# Patient Record
Sex: MALE | Race: WHITE | Employment: OTHER | URBAN - METROPOLITAN AREA
[De-identification: names, ages, dates, MRNs, and addresses within clinical notes are randomized per-mention and may not be internally consistent; named-entity substitution may affect disease eponyms.]

---

## 2019-11-14 PROBLEM — M51.36 DDD (DEGENERATIVE DISC DISEASE), LUMBAR: Status: ACTIVE | Noted: 2019-11-14

## 2019-11-14 PROBLEM — M21.371 FOOT DROP, RIGHT: Status: ACTIVE | Noted: 2019-11-14

## 2019-11-14 PROBLEM — M48.062 LUMBAR STENOSIS WITH NEUROGENIC CLAUDICATION: Status: ACTIVE | Noted: 2019-11-14

## 2019-12-02 ENCOUNTER — HOSPITAL ENCOUNTER (OUTPATIENT)
Dept: SURGERY | Age: 79
Discharge: HOME | End: 2019-12-02
Payer: MEDICARE

## 2019-12-02 VITALS
HEART RATE: 73 BPM | WEIGHT: 131.25 LBS | BODY MASS INDEX: 21.87 KG/M2 | RESPIRATION RATE: 18 BRPM | TEMPERATURE: 97.7 F | HEIGHT: 65 IN | OXYGEN SATURATION: 98 %

## 2019-12-02 LAB
ANION GAP SERPL CALC-SCNC: 7 MMOL/L (ref 7–16)
APPEARANCE UR: CLEAR
BACTERIA SPEC CULT: NORMAL
BACTERIA URNS QL MICRO: 0 /HPF
BASOPHILS # BLD: 0 K/UL (ref 0–0.2)
BASOPHILS NFR BLD: 1 % (ref 0–2)
BILIRUB UR QL: NEGATIVE
BUN SERPL-MCNC: 19 MG/DL (ref 8–23)
CALCIUM SERPL-MCNC: 8.9 MG/DL (ref 8.3–10.4)
CASTS URNS QL MICRO: ABNORMAL /LPF
CHLORIDE SERPL-SCNC: 109 MMOL/L (ref 98–107)
CO2 SERPL-SCNC: 29 MMOL/L (ref 21–32)
COLOR UR: YELLOW
CREAT SERPL-MCNC: 0.92 MG/DL (ref 0.8–1.5)
DIFFERENTIAL METHOD BLD: ABNORMAL
EOSINOPHIL # BLD: 0.1 K/UL (ref 0–0.8)
EOSINOPHIL NFR BLD: 2 % (ref 0.5–7.8)
EPI CELLS #/AREA URNS HPF: ABNORMAL /HPF
ERYTHROCYTE [DISTWIDTH] IN BLOOD BY AUTOMATED COUNT: 12.8 % (ref 11.9–14.6)
GLUCOSE SERPL-MCNC: 140 MG/DL (ref 65–100)
GLUCOSE UR STRIP.AUTO-MCNC: 100 MG/DL
HCT VFR BLD AUTO: 40.8 % (ref 41.1–50.3)
HGB BLD-MCNC: 13.6 G/DL (ref 13.6–17.2)
HGB UR QL STRIP: NEGATIVE
IMM GRANULOCYTES # BLD AUTO: 0 K/UL (ref 0–0.5)
IMM GRANULOCYTES NFR BLD AUTO: 0 % (ref 0–5)
KETONES UR QL STRIP.AUTO: NEGATIVE MG/DL
LEUKOCYTE ESTERASE UR QL STRIP.AUTO: ABNORMAL
LYMPHOCYTES # BLD: 1.3 K/UL (ref 0.5–4.6)
LYMPHOCYTES NFR BLD: 23 % (ref 13–44)
MCH RBC QN AUTO: 33.3 PG (ref 26.1–32.9)
MCHC RBC AUTO-ENTMCNC: 33.3 G/DL (ref 31.4–35)
MCV RBC AUTO: 99.8 FL (ref 79.6–97.8)
MONOCYTES # BLD: 0.6 K/UL (ref 0.1–1.3)
MONOCYTES NFR BLD: 10 % (ref 4–12)
NEUTS SEG # BLD: 3.6 K/UL (ref 1.7–8.2)
NEUTS SEG NFR BLD: 65 % (ref 43–78)
NITRITE UR QL STRIP.AUTO: NEGATIVE
NRBC # BLD: 0 K/UL (ref 0–0.2)
PH UR STRIP: 6 [PH] (ref 5–9)
PLATELET # BLD AUTO: 94 K/UL (ref 150–450)
PMV BLD AUTO: 11.6 FL (ref 9.4–12.3)
POTASSIUM SERPL-SCNC: 3.6 MMOL/L (ref 3.5–5.1)
PROT UR STRIP-MCNC: NEGATIVE MG/DL
RBC # BLD AUTO: 4.09 M/UL (ref 4.23–5.6)
RBC #/AREA URNS HPF: ABNORMAL /HPF
SERVICE CMNT-IMP: NORMAL
SODIUM SERPL-SCNC: 145 MMOL/L (ref 136–145)
SP GR UR REFRACTOMETRY: 1.02 (ref 1–1.02)
UROBILINOGEN UR QL STRIP.AUTO: 1 EU/DL (ref 0.2–1)
WBC # BLD AUTO: 5.6 K/UL (ref 4.3–11.1)
WBC URNS QL MICRO: ABNORMAL /HPF

## 2019-12-02 PROCEDURE — 85025 COMPLETE CBC W/AUTO DIFF WBC: CPT

## 2019-12-02 PROCEDURE — 81001 URINALYSIS AUTO W/SCOPE: CPT

## 2019-12-02 PROCEDURE — 80048 BASIC METABOLIC PNL TOTAL CA: CPT

## 2019-12-02 PROCEDURE — 87641 MR-STAPH DNA AMP PROBE: CPT

## 2019-12-02 NOTE — PERIOP NOTES
PLEASE CONTINUE TAKING ALL PRESCRIPTION MEDICATIONS UP TO THE DAY OF SURGERY UNLESS OTHERWISE DIRECTED BELOW. DISCONTINUE all vitamins and supplements 7 days prior to surgery. Home Medications to take  the day of surgery    Tylenol if needed, Levothyroxin           Home Medications   to Hold   Vitamin D, COQ10   Vitamins, Supplements, and Herbals. Non-Steriodal Anti-Inflammatory (NSAIDS) such as Advil and Aleve. Comments                Please do not bring home medications with you on the day of surgery unless otherwise directed by your nurse. If you are instructed to bring home medications, please give them to your nurse as they will be administered by the nursing staff. If you have any questions, [facility] [phone] or [facility] [phone].

## 2019-12-02 NOTE — PERIOP NOTES
Recent Results (from the past 12 hour(s))   CBC WITH AUTOMATED DIFF    Collection Time: 12/02/19 11:02 AM   Result Value Ref Range    WBC 5.6 4.3 - 11.1 K/uL    RBC 4.09 (L) 4.23 - 5.6 M/uL    HGB 13.6 13.6 - 17.2 g/dL    HCT 40.8 (L) 41.1 - 50.3 %    MCV 99.8 (H) 79.6 - 97.8 FL    MCH 33.3 (H) 26.1 - 32.9 PG    MCHC 33.3 31.4 - 35.0 g/dL    RDW 12.8 11.9 - 14.6 %    PLATELET 94 (L) 183 - 450 K/uL    MPV 11.6 9.4 - 12.3 FL    ABSOLUTE NRBC 0.00 0.0 - 0.2 K/uL    DF AUTOMATED      NEUTROPHILS 65 43 - 78 %    LYMPHOCYTES 23 13 - 44 %    MONOCYTES 10 4.0 - 12.0 %    EOSINOPHILS 2 0.5 - 7.8 %    BASOPHILS 1 0.0 - 2.0 %    IMMATURE GRANULOCYTES 0 0.0 - 5.0 %    ABS. NEUTROPHILS 3.6 1.7 - 8.2 K/UL    ABS. LYMPHOCYTES 1.3 0.5 - 4.6 K/UL    ABS. MONOCYTES 0.6 0.1 - 1.3 K/UL    ABS. EOSINOPHILS 0.1 0.0 - 0.8 K/UL    ABS. BASOPHILS 0.0 0.0 - 0.2 K/UL    ABS. IMM.  GRANS. 0.0 0.0 - 0.5 K/UL   METABOLIC PANEL, BASIC    Collection Time: 12/02/19 11:02 AM   Result Value Ref Range    Sodium 145 136 - 145 mmol/L    Potassium 3.6 3.5 - 5.1 mmol/L    Chloride 109 (H) 98 - 107 mmol/L    CO2 29 21 - 32 mmol/L    Anion gap 7 7 - 16 mmol/L    Glucose 140 (H) 65 - 100 mg/dL    BUN 19 8 - 23 MG/DL    Creatinine 0.92 0.8 - 1.5 MG/DL    GFR est AA >60 >60 ml/min/1.73m2    GFR est non-AA >60 >60 ml/min/1.73m2    Calcium 8.9 8.3 - 10.4 MG/DL   URINALYSIS W/ RFLX MICROSCOPIC    Collection Time: 12/02/19 11:02 AM   Result Value Ref Range    Color YELLOW      Appearance CLEAR      Specific gravity 1.018 1.001 - 1.023      pH (UA) 6.0 5.0 - 9.0      Protein NEGATIVE  NEG mg/dL    Glucose 100 mg/dL    Ketone NEGATIVE  NEG mg/dL    Bilirubin NEGATIVE  NEG      Blood NEGATIVE  NEG      Urobilinogen 1.0 0.2 - 1.0 EU/dL    Nitrites NEGATIVE  NEG      Leukocyte Esterase TRACE (A) NEG      WBC 0-3 0 /hpf    RBC 0-3 0 /hpf    Epithelial cells 0-3 0 /hpf    Bacteria 0 0 /hpf    Casts 0-3 0 /lpf   MSSA/MRSA SC BY PCR, NASAL SWAB    Collection Time: 12/02/19 11:02 AM   Result Value Ref Range    Special Requests: NO SPECIAL REQUESTS      Culture result:        SA target not detected. A MRSA NEGATIVE, SA NEGATIVE test result does not preclude MRSA or SA nasal colonization.    reviewed

## 2019-12-02 NOTE — PERIOP NOTES
Patient verified name and . Patient provided medical/health information and PTA medications to the best of their ability. TYPE  CASE:1B  Order for consent  found in EHR and matches case posting. Labs per surgeon:per anesthesia. Results: pending  Labs per anesthesia protocol: cbc,bmp,ua,mrsa. Results pending  EKG  :  Not needed at time of PAT    Patient provided with and instructed on education handouts including Guide to Surgery, blood transfusions, pain management, and hand hygiene for the family and community, and [facility] brochure.     hibiclens and instructions given per hospital policy. Instructed patient to continue previous medications as prescribed prior to surgery unless otherwise directed and to take the following medications the day of surgery according to anesthesia guidelines : Tylenol if needed, Levothyroxine . Instructed patient to hold  the following medications: Vitamin D, CoQ10. Original medication prescription bottles not visualized during patient appointment. Patient teach back successful and patient demonstrates knowledge of instruction.

## 2019-12-05 ENCOUNTER — ANESTHESIA EVENT (OUTPATIENT)
Dept: SURGERY | Age: 79
DRG: 519 | End: 2019-12-05
Payer: MEDICARE

## 2019-12-06 ENCOUNTER — ANESTHESIA (OUTPATIENT)
Dept: SURGERY | Age: 79
DRG: 519 | End: 2019-12-06
Payer: MEDICARE

## 2019-12-06 ENCOUNTER — HOSPITAL ENCOUNTER (INPATIENT)
Age: 79
LOS: 5 days | Discharge: NURSING, REHAB OR LONG-TERM CARE | DRG: 519 | End: 2019-12-11
Attending: NEUROLOGICAL SURGERY | Admitting: NEUROLOGICAL SURGERY
Payer: MEDICARE

## 2019-12-06 ENCOUNTER — APPOINTMENT (OUTPATIENT)
Dept: GENERAL RADIOLOGY | Age: 79
DRG: 519 | End: 2019-12-06
Attending: NEUROLOGICAL SURGERY
Payer: MEDICARE

## 2019-12-06 DIAGNOSIS — M48.062 LUMBAR STENOSIS WITH NEUROGENIC CLAUDICATION: Primary | ICD-10-CM

## 2019-12-06 PROCEDURE — 74011000272 HC RX REV CODE- 272: Performed by: NEUROLOGICAL SURGERY

## 2019-12-06 PROCEDURE — 51798 US URINE CAPACITY MEASURE: CPT

## 2019-12-06 PROCEDURE — 77030018836 HC SOL IRR NACL ICUM -A: Performed by: NEUROLOGICAL SURGERY

## 2019-12-06 PROCEDURE — 76010000173 HC OR TIME 3 TO 3.5 HR INTENSV-TIER 1: Performed by: NEUROLOGICAL SURGERY

## 2019-12-06 PROCEDURE — 77030018390 HC SPNG HEMSTAT2 J&J -B: Performed by: NEUROLOGICAL SURGERY

## 2019-12-06 PROCEDURE — 74011250636 HC RX REV CODE- 250/636: Performed by: REGISTERED NURSE

## 2019-12-06 PROCEDURE — 74011000250 HC RX REV CODE- 250: Performed by: REGISTERED NURSE

## 2019-12-06 PROCEDURE — 76210000006 HC OR PH I REC 0.5 TO 1 HR: Performed by: NEUROLOGICAL SURGERY

## 2019-12-06 PROCEDURE — 77030014007 HC SPNG HEMSTAT J&J -B: Performed by: NEUROLOGICAL SURGERY

## 2019-12-06 PROCEDURE — 77030039425 HC BLD LARYNG TRULITE DISP TELE -A: Performed by: ANESTHESIOLOGY

## 2019-12-06 PROCEDURE — 00NY0ZZ RELEASE LUMBAR SPINAL CORD, OPEN APPROACH: ICD-10-PCS | Performed by: NEUROLOGICAL SURGERY

## 2019-12-06 PROCEDURE — 77030037088 HC TUBE ENDOTRACH ORAL NSL COVD-A: Performed by: ANESTHESIOLOGY

## 2019-12-06 PROCEDURE — 77030019908 HC STETH ESOPH SIMS -A: Performed by: ANESTHESIOLOGY

## 2019-12-06 PROCEDURE — C1713 ANCHOR/SCREW BN/BN,TIS/BN: HCPCS | Performed by: NEUROLOGICAL SURGERY

## 2019-12-06 PROCEDURE — 77030028270 HC SRGFL HEMSTAT MTRX J&J -C: Performed by: NEUROLOGICAL SURGERY

## 2019-12-06 PROCEDURE — 74011250636 HC RX REV CODE- 250/636: Performed by: ANESTHESIOLOGY

## 2019-12-06 PROCEDURE — 77030030163 HC BN WAX J&J -A: Performed by: NEUROLOGICAL SURGERY

## 2019-12-06 PROCEDURE — 77010033678 HC OXYGEN DAILY

## 2019-12-06 PROCEDURE — 77030008468 HC STPLR SKN VISIS TELE -A: Performed by: NEUROLOGICAL SURGERY

## 2019-12-06 PROCEDURE — 74011000250 HC RX REV CODE- 250: Performed by: NEUROLOGICAL SURGERY

## 2019-12-06 PROCEDURE — 77030003029 HC SUT VCRL J&J -B: Performed by: NEUROLOGICAL SURGERY

## 2019-12-06 PROCEDURE — 77030040361 HC SLV COMPR DVT MDII -B: Performed by: NEUROLOGICAL SURGERY

## 2019-12-06 PROCEDURE — 94760 N-INVAS EAR/PLS OXIMETRY 1: CPT

## 2019-12-06 PROCEDURE — 77030033269 HC SLV COMPR SCD KNE2 CARD -B: Performed by: NEUROLOGICAL SURGERY

## 2019-12-06 PROCEDURE — 00QT0ZZ REPAIR SPINAL MENINGES, OPEN APPROACH: ICD-10-PCS | Performed by: NEUROLOGICAL SURGERY

## 2019-12-06 PROCEDURE — 77030002700 HC GRFT DURA SUTRBL INLC -D: Performed by: NEUROLOGICAL SURGERY

## 2019-12-06 PROCEDURE — 77030012894: Performed by: NEUROLOGICAL SURGERY

## 2019-12-06 PROCEDURE — 77030039267 HC ADH SKN EXOFIN S2SG -B: Performed by: NEUROLOGICAL SURGERY

## 2019-12-06 PROCEDURE — 65270000029 HC RM PRIVATE

## 2019-12-06 PROCEDURE — 74011250636 HC RX REV CODE- 250/636: Performed by: NEUROLOGICAL SURGERY

## 2019-12-06 PROCEDURE — 77030040922 HC BLNKT HYPOTHRM STRY -A: Performed by: ANESTHESIOLOGY

## 2019-12-06 PROCEDURE — 72020 X-RAY EXAM OF SPINE 1 VIEW: CPT

## 2019-12-06 PROCEDURE — 77030012935 HC DRSG AQUACEL BMS -B: Performed by: NEUROLOGICAL SURGERY

## 2019-12-06 PROCEDURE — 77030013951 HC SEAL TISS ADH DURASL KT INLC -G1: Performed by: NEUROLOGICAL SURGERY

## 2019-12-06 PROCEDURE — 76060000037 HC ANESTHESIA 3 TO 3.5 HR: Performed by: NEUROLOGICAL SURGERY

## 2019-12-06 PROCEDURE — 74011250637 HC RX REV CODE- 250/637: Performed by: NEUROLOGICAL SURGERY

## 2019-12-06 DEVICE — DURAGEN® DURAL GRAFT MATRIX 1PK, 2X2 DOM
Type: IMPLANTABLE DEVICE | Site: SPINE LUMBAR | Status: FUNCTIONAL
Brand: DURAGEN®

## 2019-12-06 RX ORDER — LIDOCAINE HYDROCHLORIDE 20 MG/ML
INJECTION, SOLUTION EPIDURAL; INFILTRATION; INTRACAUDAL; PERINEURAL AS NEEDED
Status: DISCONTINUED | OUTPATIENT
Start: 2019-12-06 | End: 2019-12-06 | Stop reason: HOSPADM

## 2019-12-06 RX ORDER — NALOXONE HYDROCHLORIDE 0.4 MG/ML
0.1 INJECTION, SOLUTION INTRAMUSCULAR; INTRAVENOUS; SUBCUTANEOUS AS NEEDED
Status: DISCONTINUED | OUTPATIENT
Start: 2019-12-06 | End: 2019-12-06 | Stop reason: HOSPADM

## 2019-12-06 RX ORDER — SODIUM CHLORIDE, SODIUM LACTATE, POTASSIUM CHLORIDE, CALCIUM CHLORIDE 600; 310; 30; 20 MG/100ML; MG/100ML; MG/100ML; MG/100ML
100 INJECTION, SOLUTION INTRAVENOUS CONTINUOUS
Status: DISCONTINUED | OUTPATIENT
Start: 2019-12-06 | End: 2019-12-06 | Stop reason: HOSPADM

## 2019-12-06 RX ORDER — OXYCODONE AND ACETAMINOPHEN 10; 325 MG/1; MG/1
1 TABLET ORAL
Status: DISCONTINUED | OUTPATIENT
Start: 2019-12-06 | End: 2019-12-11 | Stop reason: HOSPADM

## 2019-12-06 RX ORDER — ROCURONIUM BROMIDE 10 MG/ML
INJECTION, SOLUTION INTRAVENOUS AS NEEDED
Status: DISCONTINUED | OUTPATIENT
Start: 2019-12-06 | End: 2019-12-06 | Stop reason: HOSPADM

## 2019-12-06 RX ORDER — ATORVASTATIN CALCIUM 10 MG/1
10 TABLET, FILM COATED ORAL
Status: DISCONTINUED | OUTPATIENT
Start: 2019-12-06 | End: 2019-12-11 | Stop reason: HOSPADM

## 2019-12-06 RX ORDER — FENTANYL CITRATE 50 UG/ML
INJECTION, SOLUTION INTRAMUSCULAR; INTRAVENOUS AS NEEDED
Status: DISCONTINUED | OUTPATIENT
Start: 2019-12-06 | End: 2019-12-06 | Stop reason: HOSPADM

## 2019-12-06 RX ORDER — HYDROMORPHONE HYDROCHLORIDE 1 MG/ML
1 INJECTION, SOLUTION INTRAMUSCULAR; INTRAVENOUS; SUBCUTANEOUS
Status: DISCONTINUED | OUTPATIENT
Start: 2019-12-06 | End: 2019-12-07

## 2019-12-06 RX ORDER — ONDANSETRON 2 MG/ML
4 INJECTION INTRAMUSCULAR; INTRAVENOUS
Status: DISCONTINUED | OUTPATIENT
Start: 2019-12-06 | End: 2019-12-11 | Stop reason: HOSPADM

## 2019-12-06 RX ORDER — SODIUM CHLORIDE 0.9 % (FLUSH) 0.9 %
5-40 SYRINGE (ML) INJECTION EVERY 8 HOURS
Status: DISCONTINUED | OUTPATIENT
Start: 2019-12-06 | End: 2019-12-11 | Stop reason: HOSPADM

## 2019-12-06 RX ORDER — NEOSTIGMINE METHYLSULFATE 1 MG/ML
INJECTION, SOLUTION INTRAVENOUS AS NEEDED
Status: DISCONTINUED | OUTPATIENT
Start: 2019-12-06 | End: 2019-12-06 | Stop reason: HOSPADM

## 2019-12-06 RX ORDER — GLYCOPYRROLATE 0.2 MG/ML
INJECTION INTRAMUSCULAR; INTRAVENOUS AS NEEDED
Status: DISCONTINUED | OUTPATIENT
Start: 2019-12-06 | End: 2019-12-06 | Stop reason: HOSPADM

## 2019-12-06 RX ORDER — FLUTICASONE PROPIONATE 50 MCG
2 SPRAY, SUSPENSION (ML) NASAL DAILY
Status: DISCONTINUED | OUTPATIENT
Start: 2019-12-07 | End: 2019-12-11 | Stop reason: HOSPADM

## 2019-12-06 RX ORDER — ACETAMINOPHEN 10 MG/ML
INJECTION, SOLUTION INTRAVENOUS AS NEEDED
Status: DISCONTINUED | OUTPATIENT
Start: 2019-12-06 | End: 2019-12-06 | Stop reason: HOSPADM

## 2019-12-06 RX ORDER — ACETAMINOPHEN 325 MG/1
650 TABLET ORAL
Status: DISCONTINUED | OUTPATIENT
Start: 2019-12-06 | End: 2019-12-11 | Stop reason: HOSPADM

## 2019-12-06 RX ORDER — SODIUM CHLORIDE 0.9 % (FLUSH) 0.9 %
5-40 SYRINGE (ML) INJECTION AS NEEDED
Status: DISCONTINUED | OUTPATIENT
Start: 2019-12-06 | End: 2019-12-11 | Stop reason: HOSPADM

## 2019-12-06 RX ORDER — ZOLPIDEM TARTRATE 5 MG/1
5 TABLET ORAL
Status: DISCONTINUED | OUTPATIENT
Start: 2019-12-06 | End: 2019-12-11 | Stop reason: HOSPADM

## 2019-12-06 RX ORDER — OXYCODONE HYDROCHLORIDE 5 MG/1
5 TABLET ORAL
Status: DISCONTINUED | OUTPATIENT
Start: 2019-12-06 | End: 2019-12-06 | Stop reason: HOSPADM

## 2019-12-06 RX ORDER — DIPHENHYDRAMINE HYDROCHLORIDE 50 MG/ML
12.5 INJECTION, SOLUTION INTRAMUSCULAR; INTRAVENOUS
Status: DISCONTINUED | OUTPATIENT
Start: 2019-12-06 | End: 2019-12-06 | Stop reason: HOSPADM

## 2019-12-06 RX ORDER — PROPOFOL 10 MG/ML
INJECTION, EMULSION INTRAVENOUS AS NEEDED
Status: DISCONTINUED | OUTPATIENT
Start: 2019-12-06 | End: 2019-12-06 | Stop reason: HOSPADM

## 2019-12-06 RX ORDER — ALBUTEROL SULFATE 0.83 MG/ML
2.5 SOLUTION RESPIRATORY (INHALATION) AS NEEDED
Status: DISCONTINUED | OUTPATIENT
Start: 2019-12-06 | End: 2019-12-06 | Stop reason: HOSPADM

## 2019-12-06 RX ORDER — MIDAZOLAM HYDROCHLORIDE 1 MG/ML
2 INJECTION, SOLUTION INTRAMUSCULAR; INTRAVENOUS
Status: ACTIVE | OUTPATIENT
Start: 2019-12-06 | End: 2019-12-07

## 2019-12-06 RX ORDER — EPHEDRINE SULFATE/0.9% NACL/PF 50 MG/5 ML
SYRINGE (ML) INTRAVENOUS AS NEEDED
Status: DISCONTINUED | OUTPATIENT
Start: 2019-12-06 | End: 2019-12-06 | Stop reason: HOSPADM

## 2019-12-06 RX ORDER — LORAZEPAM 0.5 MG/1
1 TABLET ORAL
Status: DISCONTINUED | OUTPATIENT
Start: 2019-12-06 | End: 2019-12-11 | Stop reason: HOSPADM

## 2019-12-06 RX ORDER — SODIUM CHLORIDE 0.9 % (FLUSH) 0.9 %
5-40 SYRINGE (ML) INJECTION EVERY 8 HOURS
Status: DISCONTINUED | OUTPATIENT
Start: 2019-12-06 | End: 2019-12-11 | Stop reason: SDUPTHER

## 2019-12-06 RX ORDER — OXYCODONE HYDROCHLORIDE 5 MG/1
10 TABLET ORAL
Status: DISCONTINUED | OUTPATIENT
Start: 2019-12-06 | End: 2019-12-06 | Stop reason: HOSPADM

## 2019-12-06 RX ORDER — FOSINOPRIL SODIUM 20 MG/1
20 TABLET ORAL DAILY
Status: DISCONTINUED | OUTPATIENT
Start: 2019-12-07 | End: 2019-12-11 | Stop reason: HOSPADM

## 2019-12-06 RX ORDER — SODIUM CHLORIDE, SODIUM LACTATE, POTASSIUM CHLORIDE, CALCIUM CHLORIDE 600; 310; 30; 20 MG/100ML; MG/100ML; MG/100ML; MG/100ML
100 INJECTION, SOLUTION INTRAVENOUS CONTINUOUS
Status: DISPENSED | OUTPATIENT
Start: 2019-12-06 | End: 2019-12-07

## 2019-12-06 RX ORDER — CEFAZOLIN SODIUM/WATER 2 G/20 ML
2 SYRINGE (ML) INTRAVENOUS EVERY 8 HOURS
Status: DISCONTINUED | OUTPATIENT
Start: 2019-12-06 | End: 2019-12-10

## 2019-12-06 RX ORDER — TERAZOSIN 5 MG/1
5 CAPSULE ORAL
Status: DISCONTINUED | OUTPATIENT
Start: 2019-12-06 | End: 2019-12-11 | Stop reason: HOSPADM

## 2019-12-06 RX ORDER — ONDANSETRON 2 MG/ML
4 INJECTION INTRAMUSCULAR; INTRAVENOUS ONCE
Status: DISCONTINUED | OUTPATIENT
Start: 2019-12-06 | End: 2019-12-06 | Stop reason: HOSPADM

## 2019-12-06 RX ORDER — LATANOPROST 50 UG/ML
1 SOLUTION/ DROPS OPHTHALMIC
Status: DISCONTINUED | OUTPATIENT
Start: 2019-12-06 | End: 2019-12-11 | Stop reason: HOSPADM

## 2019-12-06 RX ORDER — LEVOTHYROXINE SODIUM 50 UG/1
50 TABLET ORAL
Status: DISCONTINUED | OUTPATIENT
Start: 2019-12-07 | End: 2019-12-11 | Stop reason: HOSPADM

## 2019-12-06 RX ORDER — MIDAZOLAM HYDROCHLORIDE 1 MG/ML
2 INJECTION, SOLUTION INTRAMUSCULAR; INTRAVENOUS ONCE
Status: ACTIVE | OUTPATIENT
Start: 2019-12-06 | End: 2019-12-06

## 2019-12-06 RX ORDER — HYDROMORPHONE HYDROCHLORIDE 2 MG/ML
0.5 INJECTION, SOLUTION INTRAMUSCULAR; INTRAVENOUS; SUBCUTANEOUS
Status: DISCONTINUED | OUTPATIENT
Start: 2019-12-06 | End: 2019-12-06 | Stop reason: HOSPADM

## 2019-12-06 RX ORDER — CEFAZOLIN SODIUM/WATER 2 G/20 ML
2 SYRINGE (ML) INTRAVENOUS ONCE
Status: COMPLETED | OUTPATIENT
Start: 2019-12-06 | End: 2019-12-06

## 2019-12-06 RX ORDER — FENTANYL CITRATE 50 UG/ML
100 INJECTION, SOLUTION INTRAMUSCULAR; INTRAVENOUS ONCE
Status: ACTIVE | OUTPATIENT
Start: 2019-12-06 | End: 2019-12-06

## 2019-12-06 RX ORDER — ONDANSETRON 2 MG/ML
INJECTION INTRAMUSCULAR; INTRAVENOUS AS NEEDED
Status: DISCONTINUED | OUTPATIENT
Start: 2019-12-06 | End: 2019-12-06 | Stop reason: HOSPADM

## 2019-12-06 RX ORDER — LIDOCAINE HYDROCHLORIDE 10 MG/ML
0.1 INJECTION INFILTRATION; PERINEURAL AS NEEDED
Status: DISCONTINUED | OUTPATIENT
Start: 2019-12-06 | End: 2019-12-11 | Stop reason: HOSPADM

## 2019-12-06 RX ORDER — MELATONIN
2 2 TIMES DAILY
Status: DISCONTINUED | OUTPATIENT
Start: 2019-12-06 | End: 2019-12-11 | Stop reason: HOSPADM

## 2019-12-06 RX ORDER — SODIUM CHLORIDE, SODIUM LACTATE, POTASSIUM CHLORIDE, CALCIUM CHLORIDE 600; 310; 30; 20 MG/100ML; MG/100ML; MG/100ML; MG/100ML
100 INJECTION, SOLUTION INTRAVENOUS CONTINUOUS
Status: DISCONTINUED | OUTPATIENT
Start: 2019-12-06 | End: 2019-12-10

## 2019-12-06 RX ORDER — SODIUM CHLORIDE 0.9 % (FLUSH) 0.9 %
5-40 SYRINGE (ML) INJECTION AS NEEDED
Status: DISCONTINUED | OUTPATIENT
Start: 2019-12-06 | End: 2019-12-11 | Stop reason: SDUPTHER

## 2019-12-06 RX ORDER — DEXAMETHASONE SODIUM PHOSPHATE 4 MG/ML
INJECTION, SOLUTION INTRA-ARTICULAR; INTRALESIONAL; INTRAMUSCULAR; INTRAVENOUS; SOFT TISSUE AS NEEDED
Status: DISCONTINUED | OUTPATIENT
Start: 2019-12-06 | End: 2019-12-06 | Stop reason: HOSPADM

## 2019-12-06 RX ADMIN — GLYCOPYRROLATE 0.4 MG: 0.2 INJECTION, SOLUTION INTRAMUSCULAR; INTRAVENOUS at 13:15

## 2019-12-06 RX ADMIN — Medication 3 MG: at 13:15

## 2019-12-06 RX ADMIN — ACETAMINOPHEN 1000 MG: 10 INJECTION, SOLUTION INTRAVENOUS at 12:44

## 2019-12-06 RX ADMIN — ROCURONIUM BROMIDE 50 MG: 10 INJECTION, SOLUTION INTRAVENOUS at 10:18

## 2019-12-06 RX ADMIN — PHENYLEPHRINE HYDROCHLORIDE 100 MCG: 10 INJECTION INTRAVENOUS at 11:49

## 2019-12-06 RX ADMIN — PHENYLEPHRINE HYDROCHLORIDE 100 MCG: 10 INJECTION INTRAVENOUS at 11:36

## 2019-12-06 RX ADMIN — PHENYLEPHRINE HYDROCHLORIDE 100 MCG: 10 INJECTION INTRAVENOUS at 10:29

## 2019-12-06 RX ADMIN — FENTANYL CITRATE 50 MCG: 50 INJECTION INTRAMUSCULAR; INTRAVENOUS at 12:22

## 2019-12-06 RX ADMIN — Medication 2 G: at 17:24

## 2019-12-06 RX ADMIN — PHENYLEPHRINE HYDROCHLORIDE 100 MCG: 10 INJECTION INTRAVENOUS at 12:22

## 2019-12-06 RX ADMIN — ONDANSETRON 4 MG: 2 INJECTION INTRAMUSCULAR; INTRAVENOUS at 12:45

## 2019-12-06 RX ADMIN — Medication 3 AMPULE: at 08:55

## 2019-12-06 RX ADMIN — DEXAMETHASONE SODIUM PHOSPHATE 10 MG: 4 INJECTION, SOLUTION INTRAMUSCULAR; INTRAVENOUS at 10:47

## 2019-12-06 RX ADMIN — LATANOPROST 1 DROP: 50 SOLUTION OPHTHALMIC at 21:15

## 2019-12-06 RX ADMIN — PHENYLEPHRINE HYDROCHLORIDE 100 MCG: 10 INJECTION INTRAVENOUS at 11:09

## 2019-12-06 RX ADMIN — Medication 2 G: at 10:26

## 2019-12-06 RX ADMIN — FENTANYL CITRATE 50 MCG: 50 INJECTION INTRAMUSCULAR; INTRAVENOUS at 13:26

## 2019-12-06 RX ADMIN — Medication 7.5 MG: at 11:09

## 2019-12-06 RX ADMIN — FENTANYL CITRATE 50 MCG: 50 INJECTION INTRAMUSCULAR; INTRAVENOUS at 10:12

## 2019-12-06 RX ADMIN — Medication 7.5 MG: at 11:58

## 2019-12-06 RX ADMIN — TERAZOSIN HYDROCHLORIDE 5 MG: 5 CAPSULE ORAL at 21:15

## 2019-12-06 RX ADMIN — ROCURONIUM BROMIDE 5 MG: 10 INJECTION, SOLUTION INTRAVENOUS at 12:37

## 2019-12-06 RX ADMIN — OXYCODONE HYDROCHLORIDE AND ACETAMINOPHEN 1 TABLET: 10; 325 TABLET ORAL at 15:15

## 2019-12-06 RX ADMIN — Medication 1 AMPULE: at 15:06

## 2019-12-06 RX ADMIN — PHENYLEPHRINE HYDROCHLORIDE 100 MCG: 10 INJECTION INTRAVENOUS at 11:43

## 2019-12-06 RX ADMIN — Medication 5 MG: at 11:16

## 2019-12-06 RX ADMIN — Medication 10 ML: at 21:15

## 2019-12-06 RX ADMIN — PHENYLEPHRINE HYDROCHLORIDE 100 MCG: 10 INJECTION INTRAVENOUS at 11:01

## 2019-12-06 RX ADMIN — Medication 10 MG: at 11:49

## 2019-12-06 RX ADMIN — SODIUM CHLORIDE, SODIUM LACTATE, POTASSIUM CHLORIDE, AND CALCIUM CHLORIDE 100 ML/HR: 600; 310; 30; 20 INJECTION, SOLUTION INTRAVENOUS at 15:04

## 2019-12-06 RX ADMIN — SODIUM CHLORIDE, SODIUM LACTATE, POTASSIUM CHLORIDE, AND CALCIUM CHLORIDE 100 ML/HR: 600; 310; 30; 20 INJECTION, SOLUTION INTRAVENOUS at 08:55

## 2019-12-06 RX ADMIN — PHENYLEPHRINE HYDROCHLORIDE 100 MCG: 10 INJECTION INTRAVENOUS at 12:07

## 2019-12-06 RX ADMIN — FENTANYL CITRATE 50 MCG: 50 INJECTION INTRAMUSCULAR; INTRAVENOUS at 10:15

## 2019-12-06 RX ADMIN — LORAZEPAM 1 MG: 0.5 TABLET ORAL at 23:51

## 2019-12-06 RX ADMIN — HYDROMORPHONE HYDROCHLORIDE 1 MG: 1 INJECTION, SOLUTION INTRAMUSCULAR; INTRAVENOUS; SUBCUTANEOUS at 17:17

## 2019-12-06 RX ADMIN — PHENYLEPHRINE HYDROCHLORIDE 50 MCG: 10 INJECTION INTRAVENOUS at 11:16

## 2019-12-06 RX ADMIN — PHENYLEPHRINE HYDROCHLORIDE 100 MCG: 10 INJECTION INTRAVENOUS at 11:27

## 2019-12-06 RX ADMIN — PROPOFOL 150 MG: 10 INJECTION, EMULSION INTRAVENOUS at 10:17

## 2019-12-06 RX ADMIN — PHENYLEPHRINE HYDROCHLORIDE 100 MCG: 10 INJECTION INTRAVENOUS at 11:58

## 2019-12-06 RX ADMIN — Medication 5 MG: at 10:58

## 2019-12-06 RX ADMIN — PHENYLEPHRINE HYDROCHLORIDE 100 MCG: 10 INJECTION INTRAVENOUS at 10:45

## 2019-12-06 RX ADMIN — Medication 2 G: at 21:37

## 2019-12-06 RX ADMIN — Medication 1 AMPULE: at 21:15

## 2019-12-06 RX ADMIN — FENTANYL CITRATE 50 MCG: 50 INJECTION INTRAMUSCULAR; INTRAVENOUS at 10:41

## 2019-12-06 RX ADMIN — PHENYLEPHRINE HYDROCHLORIDE 100 MCG: 10 INJECTION INTRAVENOUS at 12:18

## 2019-12-06 RX ADMIN — Medication 10 ML: at 15:04

## 2019-12-06 RX ADMIN — LIDOCAINE HYDROCHLORIDE 100 MG: 20 INJECTION, SOLUTION EPIDURAL; INFILTRATION; INTRACAUDAL; PERINEURAL at 10:17

## 2019-12-06 RX ADMIN — ATORVASTATIN CALCIUM 10 MG: 10 TABLET, FILM COATED ORAL at 21:15

## 2019-12-06 RX ADMIN — PHENYLEPHRINE HYDROCHLORIDE 100 MCG: 10 INJECTION INTRAVENOUS at 10:52

## 2019-12-06 RX ADMIN — SODIUM CHLORIDE, SODIUM LACTATE, POTASSIUM CHLORIDE, AND CALCIUM CHLORIDE: 600; 310; 30; 20 INJECTION, SOLUTION INTRAVENOUS at 10:57

## 2019-12-06 RX ADMIN — ONDANSETRON 4 MG: 2 INJECTION INTRAMUSCULAR; INTRAVENOUS at 17:24

## 2019-12-06 RX ADMIN — FENTANYL CITRATE 50 MCG: 50 INJECTION INTRAMUSCULAR; INTRAVENOUS at 13:12

## 2019-12-06 RX ADMIN — VITAMIN D, TAB 1000IU (100/BT) 2 TABLET: 25 TAB at 17:17

## 2019-12-06 NOTE — PERIOP NOTES
TRANSFER - OUT REPORT:    Verbal report given to Ailyn Rubio on [facility]  being transferred to [phone] for routine post - op       Report consisted of patients Situation, Background, Assessment and   Recommendations(SBAR). Information from the following report(s) OR Summary, Procedure Summary, Intake/Output and MAR was reviewed with the receiving nurse. Lines:   Peripheral IV 12/06/19 Left Hand (Active)   Site Assessment Clean, dry, & intact 12/6/2019  1:30 PM   Phlebitis Assessment 0 12/6/2019  1:30 PM   Infiltration Assessment 0 12/6/2019  1:30 PM   Dressing Status Clean, dry, & intact; Occlusive 12/6/2019  1:30 PM   Dressing Type Transparent;Tape 12/6/2019  1:30 PM   Hub Color/Line Status Green;Patent 12/6/2019  1:30 PM   Alcohol Cap Used No 12/6/2019  1:30 PM        Opportunity for questions and clarification was provided. Patient transported with:   O2 @ 2 liters    VTE prophylaxis orders have been written for [facility]. Patient and family given floor number and nurses name. Family updated re: pt status after security code verified.

## 2019-12-06 NOTE — ANESTHESIA POSTPROCEDURE EVALUATION
Procedure(s):  L2-5 BILATERAL LAMINECTOMY AND FACETECTOMY. general    Anesthesia Post Evaluation      Multimodal analgesia: multimodal analgesia used between 6 hours prior to anesthesia start to PACU discharge  Patient location during evaluation: PACU  Patient participation: complete - patient participated  Level of consciousness: sleepy but conscious  Pain management: adequate  Airway patency: patent  Anesthetic complications: no  Cardiovascular status: acceptable  Respiratory status: acceptable  Hydration status: acceptable  Post anesthesia nausea and vomiting:  none      Vitals Value Taken Time   /57 12/6/2019  2:17 PM   Temp 36.6 °C (97.8 °F) 12/6/2019  2:09 PM   Pulse 66 12/6/2019  2:18 PM   Resp 17 12/6/2019  2:09 PM   SpO2 99 % 12/6/2019  2:18 PM   Vitals shown include unvalidated device data.

## 2019-12-06 NOTE — ANESTHESIA PREPROCEDURE EVALUATION
Relevant Problems   No relevant active problems       Anesthetic History               Review of Systems / Medical History  Patient summary reviewed, nursing notes reviewed and pertinent labs reviewed    Pulmonary                   Neuro/Psych              Cardiovascular    Hypertension: well controlled              Exercise tolerance: >4 METS     GI/Hepatic/Renal                Endo/Other      Hypothyroidism: well controlled       Other Findings              Physical Exam    Airway  Mallampati: II  TM Distance: 4 - 6 cm  Neck ROM: normal range of motion   Mouth opening: Normal     Cardiovascular  Regular rate and rhythm,  S1 and S2 normal,  no murmur, click, rub, or gallop             Dental  No notable dental hx       Pulmonary  Breath sounds clear to auscultation               Abdominal         Other Findings            Anesthetic Plan    ASA: 2  Anesthesia type: general          Induction: Intravenous  Anesthetic plan and risks discussed with: Patient

## 2019-12-06 NOTE — BRIEF OP NOTE
BRIEF OPERATIVE NOTE    Date of Procedure: 12/6/2019   Preoperative Diagnosis: Lumbar stenosis with neurogenic claudication [M48.062]  Postoperative Diagnosis: Lumbar stenosis with neurogenic claudication [M48.062]    Procedure(s):  L2-5 BILATERAL LAMINECTOMY AND FACETECTOMY  Surgeon(s) and Role:     * Jeana Block MD - Primary         Surgical Assistant: NONE    Surgical Staff:  Circ-1: Rudy Soriano RN  Circ-Relief: Asher Segovia RN  Scrub Tech-1: Zach Rebolledo  Scrub Tech-2: Norma Sesay  Scrub Tech-3: Jatinder Carranza  Event Time In Time Out   Incision Start 1035    Incision Close       Anesthesia: General   Estimated Blood Loss: MINIMAL  Specimens: * No specimens in log *   Findings: SEVERE STNOSIS  Complications: DURAL LEAK  Implants:   Implant Name Type Inv.  Item Serial No.  Lot No. LRB No. Used Action   stimulan rapid cure 5cc    SinglePlatform [id number] N/A 1 Implanted   GRAFT DURA MATRX RESRB 2X2 IN -- DURAGEN ORDER AS EA - [id number]  GRAFT DURA MATRX RESRB 2X2 IN -- DURAGEN ORDER AS EA  [employer] Sy[employer]ara LocalCIGet In [facility] [id number] N/A 1 Implanted

## 2019-12-06 NOTE — PROGRESS NOTES
TRANSFER - IN REPORT:    Verbal report received from Connie Cordon RN on [facility]  being received from PACU for routine progression of care      Report consisted of patients Situation, Background, Assessment and   Recommendations(SBAR). Information from the following report(s) SBAR was reviewed with the receiving nurse. Opportunity for questions and clarification was provided. Assessment completed upon patients arrival to unit and care assumed.

## 2019-12-06 NOTE — PROGRESS NOTES
12/06/19 1449   Dual Skin Pressure Injury Assessment   Dual Skin Pressure Injury Assessment WDL   Second Care Provider (Based on [address] [place] [address]) Bibi Betancourt, RN   Skin Integumentary   Skin Integumentary (WDL) X   Skin Color Appropriate for ethnicity   Skin Condition/Temp Warm;Dry   Skin Integrity Incision (comment)  (lowe back CDI)   Turgor Non-tenting   Hair Growth Present   Varicosities Absent

## 2019-12-07 PROCEDURE — 74011250637 HC RX REV CODE- 250/637: Performed by: NEUROLOGICAL SURGERY

## 2019-12-07 PROCEDURE — 77030020255 HC SOL INJ LR 1000ML BG

## 2019-12-07 PROCEDURE — 51798 US URINE CAPACITY MEASURE: CPT

## 2019-12-07 PROCEDURE — 77030040830 HC CATH URETH FOL MDII -A

## 2019-12-07 PROCEDURE — 74011250636 HC RX REV CODE- 250/636: Performed by: NEUROLOGICAL SURGERY

## 2019-12-07 PROCEDURE — 77030011943

## 2019-12-07 PROCEDURE — 65270000029 HC RM PRIVATE

## 2019-12-07 RX ORDER — MORPHINE SULFATE 2 MG/ML
2 INJECTION, SOLUTION INTRAMUSCULAR; INTRAVENOUS
Status: DISCONTINUED | OUTPATIENT
Start: 2019-12-07 | End: 2019-12-11 | Stop reason: HOSPADM

## 2019-12-07 RX ADMIN — SODIUM CHLORIDE, SODIUM LACTATE, POTASSIUM CHLORIDE, AND CALCIUM CHLORIDE 100 ML/HR: 600; 310; 30; 20 INJECTION, SOLUTION INTRAVENOUS at 16:56

## 2019-12-07 RX ADMIN — OXYCODONE HYDROCHLORIDE AND ACETAMINOPHEN 1 TABLET: 10; 325 TABLET ORAL at 16:54

## 2019-12-07 RX ADMIN — VITAMIN D, TAB 1000IU (100/BT) 2 TABLET: 25 TAB at 09:20

## 2019-12-07 RX ADMIN — Medication 5 ML: at 04:46

## 2019-12-07 RX ADMIN — ACETAMINOPHEN 650 MG: 325 TABLET, FILM COATED ORAL at 01:28

## 2019-12-07 RX ADMIN — OXYCODONE HYDROCHLORIDE AND ACETAMINOPHEN 1 TABLET: 10; 325 TABLET ORAL at 09:19

## 2019-12-07 RX ADMIN — Medication 1 AMPULE: at 09:18

## 2019-12-07 RX ADMIN — Medication 5 ML: at 04:45

## 2019-12-07 RX ADMIN — OXYCODONE HYDROCHLORIDE AND ACETAMINOPHEN 1 TABLET: 10; 325 TABLET ORAL at 03:52

## 2019-12-07 RX ADMIN — LATANOPROST 1 DROP: 50 SOLUTION OPHTHALMIC at 22:00

## 2019-12-07 RX ADMIN — Medication 2 G: at 14:41

## 2019-12-07 RX ADMIN — VITAMIN D, TAB 1000IU (100/BT) 2 TABLET: 25 TAB at 17:01

## 2019-12-07 RX ADMIN — ONDANSETRON 4 MG: 2 INJECTION INTRAMUSCULAR; INTRAVENOUS at 09:16

## 2019-12-07 RX ADMIN — TERAZOSIN HYDROCHLORIDE 5 MG: 5 CAPSULE ORAL at 22:47

## 2019-12-07 RX ADMIN — Medication 1 AMPULE: at 22:25

## 2019-12-07 RX ADMIN — Medication 5 ML: at 23:33

## 2019-12-07 RX ADMIN — LEVOTHYROXINE SODIUM 50 MCG: 50 TABLET ORAL at 04:45

## 2019-12-07 RX ADMIN — Medication 10 ML: at 22:26

## 2019-12-07 RX ADMIN — Medication 10 ML: at 14:42

## 2019-12-07 RX ADMIN — Medication 2 G: at 05:07

## 2019-12-07 RX ADMIN — FLUTICASONE PROPIONATE 2 SPRAY: 50 SPRAY, METERED NASAL at 09:19

## 2019-12-07 RX ADMIN — ONDANSETRON 4 MG: 2 INJECTION INTRAMUSCULAR; INTRAVENOUS at 04:45

## 2019-12-07 RX ADMIN — Medication 2 G: at 22:48

## 2019-12-07 RX ADMIN — LORAZEPAM 1 MG: 0.5 TABLET ORAL at 23:32

## 2019-12-07 RX ADMIN — ATORVASTATIN CALCIUM 10 MG: 10 TABLET, FILM COATED ORAL at 22:25

## 2019-12-07 RX ADMIN — OXYCODONE HYDROCHLORIDE AND ACETAMINOPHEN 1 TABLET: 10; 325 TABLET ORAL at 22:25

## 2019-12-07 RX ADMIN — SODIUM CHLORIDE, SODIUM LACTATE, POTASSIUM CHLORIDE, AND CALCIUM CHLORIDE 100 ML/HR: 600; 310; 30; 20 INJECTION, SOLUTION INTRAVENOUS at 09:22

## 2019-12-07 RX ADMIN — ONDANSETRON 4 MG: 2 INJECTION INTRAMUSCULAR; INTRAVENOUS at 00:43

## 2019-12-07 NOTE — PROGRESS NOTES
POD #1  S: S/P Lumbar Laminectomy L2-S1. Was nauseated last evening ? dilaudid. Unable to void. . Veronica inserted. Pain relieved by oral narcotics    O: Afebrile, VS stable       Neurologically intact except for slight foot drop right. Unchanged from preop    A: satisfactory post op course    P D/c Dilaudid and switch to Morphine      Continue veronica      Continue flat in bed.     Cathy Gonsalez MD  Neurosurgery  [phone]

## 2019-12-07 NOTE — PROGRESS NOTES
PT note: Orders received and chart reviewed. Per surgeon note, pt to lay flat for 72 hours post op, which would put him stable for therapy evaluation on Monday 12/9. Will check back for assessment on Monday. Thank you.      UBALDO CervantesT

## 2019-12-07 NOTE — OP NOTES
[address] [facility]  OPERATIVE REPORT    Name:  Bre Mckinney  MR#:  [id number]  :  1940  ACCOUNT #:  [de-identified]  DATE OF SERVICE:  2019    PREOPERATIVE DIAGNOSES:  Lumbar spinal stenosis with neurogenic claudication, L2 through S1.    POSTOPERATIVE DIAGNOSES:  Lumbar spinal stenosis with neurogenic claudication, L2 through S1.    PROCEDURES PERFORMED:  Bilateral laminectomy and medial facetectomy L2-3, L3-4, L4-5, L5-S1. SURGEON:  Chelsea Serna MD    ASSISTANT:  None. ANESTHESIA:  General endotracheal.    COMPLICATIONS:  Dural leak. SPECIMENS REMOVED:  NONE. IMPLANTS:  None. ESTIMATED BLOOD LOSS:  Minimal.    PREPARATION:  ChloraPrep. HISTORY OF PRESENT ILLNESS:  A 29-year-old gentleman with severe neurogenic claudication refractory to conservative measures with partial foot drop on the right. MRI scan was positive for severe spinal stenosis L2 through L5. The patient was admitted for surgery as conservative measures have failed. OPERATIVE NOTE:  The patient was brought to the operating room, carefully placed under general endotracheal anesthesia without complications, carefully turned prone on the Cloward frame. The posterior aspect of the back was shaved and prepped in the usual sterile fashion. A midline incision was made from L2-S1. Muscles were stripped in the bilateral subperiosteal plane with cautery and elevators. Deep retractors were placed. Lateral lumbar spine x-ray confirmed instrument pointing behind L5. Next, decompressive laminectomy and medial facetectomy  was carried out from the top of the sacrum through L2 inclusive with Leksell rongeurs and 2, 3 and 4-mm Kerrison rongeurs. Significant bony and ligamentous hypertrophy were present. A small lateral dural tear was created on the left side with exposed arachnoid noted. The bone was decompressed around this area. The rent was too far lateral for direct suture.   Therefore Duragen and DuraSeal were placed. Positive pressure breathing revealed no sign of CSF leak. The wound was copiously irrigated until clear. Antibiotic impregnated beads were placed along with Surgifoam.  The wound was dry and closed. The fascia was closed tightly with interrupted 0 Vicryl. Subcutaneous tissue closed with 3-0 Vicryl. Skin was closed with staples and a pressure dressing was placed. He was carefully turned supine, awakened, extubated, taken to PACU in stable condition.       MD YARA Dumas/[id number]  D:  12/06/2019 13:07  T:  12/06/2019 23:25  JOB #:  [id number]

## 2019-12-07 NOTE — PROGRESS NOTES
Occupational Therapy Note: Orders received and chart reviewed. Per surgeon note, pt with dural tear and is to lay flat for 72 hours post op, which would put him stable for therapy evaluation on Monday 12/9. Will check back for assessment on Monday. Thank you.  Pelon Joe MS, OTR/L

## 2019-12-07 NOTE — ROUTINE PROCESS
Notified OCP Dr. Brown Fleeting pt retaining urine. Price cath ordered for 24 hours. Will continue to monitor.

## 2019-12-07 NOTE — PROGRESS NOTES
Chart screened by  for discharge planning. No needs identified at this time. Please consult  if any new issues arise. Notes per PT/OT pt has to lay flat for 72 hours post op, pt will not receive therapy until Monday 12/9 this is confirmed with charge RN Nani Fish, Formerly named [facility] [address] will remain available until then if any needs. Care Management Interventions  PCP Verified by CM:  Yes  Current Support Network: Own Home  Discharge Location  Discharge Placement: Unable to determine at this time

## 2019-12-07 NOTE — PROGRESS NOTES
Problem: Falls - Risk of  Goal: *Absence of Falls  Description  Document Gwen Crockett Fall Risk and appropriate interventions in the flowsheet.   Outcome: Progressing Towards Goal  Note: Fall Risk Interventions:                                Problem: Patient Education: Go to Patient Education Activity  Goal: Patient/Family Education  Outcome: Progressing Towards Goal     Problem: Simple Spine Procedure:  Day of Surgery  Goal: Off Pathway (Use only if patient is Off Pathway)  Outcome: Progressing Towards Goal  Goal: Activity/Safety  Outcome: Progressing Towards Goal  Goal: Nutrition/Diet  Outcome: Progressing Towards Goal  Goal: Discharge Planning  Outcome: Progressing Towards Goal  Goal: Medications  Outcome: Progressing Towards Goal  Goal: Respiratory  Outcome: Progressing Towards Goal  Goal: *Optimal pain control at patient's stated goal  Outcome: Progressing Towards Goal  Goal: *Active bowel sounds  Outcome: Progressing Towards Goal     Problem: Simple Spine Procedure:  Day of Surgery  Goal: Off Pathway (Use only if patient is Off Pathway)  Outcome: Progressing Towards Goal  Goal: Activity/Safety  Outcome: Progressing Towards Goal  Goal: Nutrition/Diet  Outcome: Progressing Towards Goal  Goal: Discharge Planning  Outcome: Progressing Towards Goal  Goal: Medications  Outcome: Progressing Towards Goal  Goal: Respiratory  Outcome: Progressing Towards Goal  Goal: *Optimal pain control at patient's stated goal  Outcome: Progressing Towards Goal  Goal: *Active bowel sounds  Outcome: Progressing Towards Goal

## 2019-12-08 PROCEDURE — 74011250637 HC RX REV CODE- 250/637: Performed by: NEUROLOGICAL SURGERY

## 2019-12-08 PROCEDURE — 65270000029 HC RM PRIVATE

## 2019-12-08 PROCEDURE — 74011250636 HC RX REV CODE- 250/636: Performed by: NEUROLOGICAL SURGERY

## 2019-12-08 PROCEDURE — 77030020255 HC SOL INJ LR 1000ML BG

## 2019-12-08 RX ORDER — DOCUSATE SODIUM 100 MG/1
100 CAPSULE, LIQUID FILLED ORAL 2 TIMES DAILY
Status: DISCONTINUED | OUTPATIENT
Start: 2019-12-08 | End: 2019-12-11 | Stop reason: HOSPADM

## 2019-12-08 RX ADMIN — LORAZEPAM 1 MG: 0.5 TABLET ORAL at 23:15

## 2019-12-08 RX ADMIN — FLUTICASONE PROPIONATE 2 SPRAY: 50 SPRAY, METERED NASAL at 09:19

## 2019-12-08 RX ADMIN — Medication 2 G: at 23:17

## 2019-12-08 RX ADMIN — Medication 1 AMPULE: at 09:20

## 2019-12-08 RX ADMIN — Medication 10 ML: at 13:20

## 2019-12-08 RX ADMIN — ATORVASTATIN CALCIUM 10 MG: 10 TABLET, FILM COATED ORAL at 23:15

## 2019-12-08 RX ADMIN — OXYCODONE HYDROCHLORIDE AND ACETAMINOPHEN 1 TABLET: 10; 325 TABLET ORAL at 19:53

## 2019-12-08 RX ADMIN — OXYCODONE HYDROCHLORIDE AND ACETAMINOPHEN 1 TABLET: 10; 325 TABLET ORAL at 09:17

## 2019-12-08 RX ADMIN — VITAMIN D, TAB 1000IU (100/BT) 2 TABLET: 25 TAB at 09:17

## 2019-12-08 RX ADMIN — Medication 2 G: at 06:10

## 2019-12-08 RX ADMIN — DOCUSATE SODIUM 100 MG: 100 CAPSULE, LIQUID FILLED ORAL at 17:21

## 2019-12-08 RX ADMIN — VITAMIN D, TAB 1000IU (100/BT) 2 TABLET: 25 TAB at 17:21

## 2019-12-08 RX ADMIN — SODIUM CHLORIDE, SODIUM LACTATE, POTASSIUM CHLORIDE, AND CALCIUM CHLORIDE 100 ML/HR: 600; 310; 30; 20 INJECTION, SOLUTION INTRAVENOUS at 09:22

## 2019-12-08 RX ADMIN — Medication 10 ML: at 06:10

## 2019-12-08 RX ADMIN — TERAZOSIN HYDROCHLORIDE 5 MG: 5 CAPSULE ORAL at 23:15

## 2019-12-08 RX ADMIN — OXYCODONE HYDROCHLORIDE AND ACETAMINOPHEN 1 TABLET: 10; 325 TABLET ORAL at 13:26

## 2019-12-08 RX ADMIN — Medication 2 G: at 13:20

## 2019-12-08 RX ADMIN — Medication 1 AMPULE: at 23:15

## 2019-12-08 NOTE — PROGRESS NOTES
Problem: Falls - Risk of  Goal: *Absence of Falls  Description  Document Ivaurea Eckert Fall Risk and appropriate interventions in the flowsheet.   Outcome: Progressing Towards Goal  Note: Fall Risk Interventions:            Medication Interventions: Bed/chair exit alarm    Elimination Interventions: Bed/chair exit alarm, Call light in reach

## 2019-12-08 NOTE — PROGRESS NOTES
Problem: Falls - Risk of  Goal: *Absence of Falls  Description  Document Harris May Fall Risk and appropriate interventions in the flowsheet.   Outcome: Progressing Towards Goal  Note: Fall Risk Interventions:            Medication Interventions: Patient to call before getting OOB    Elimination Interventions: Call light in reach, Patient to call for help with toileting needs

## 2019-12-08 NOTE — PROGRESS NOTES
POD#2    S: S/P lumbar laminectomy. C/O constipation    O: Afebrile, VS stable    A:  Remains flat    P: Colace.        Remain flat in bed    Blanca Butler MD  Neurosurgery  [phone]

## 2019-12-09 PROCEDURE — 97116 GAIT TRAINING THERAPY: CPT

## 2019-12-09 PROCEDURE — 65270000029 HC RM PRIVATE

## 2019-12-09 PROCEDURE — 97161 PT EVAL LOW COMPLEX 20 MIN: CPT

## 2019-12-09 PROCEDURE — 97112 NEUROMUSCULAR REEDUCATION: CPT

## 2019-12-09 PROCEDURE — 77030020255 HC SOL INJ LR 1000ML BG

## 2019-12-09 PROCEDURE — 97530 THERAPEUTIC ACTIVITIES: CPT

## 2019-12-09 PROCEDURE — 74011000302 HC RX REV CODE- 302: Performed by: NEUROLOGICAL SURGERY

## 2019-12-09 PROCEDURE — 74011250637 HC RX REV CODE- 250/637: Performed by: NEUROLOGICAL SURGERY

## 2019-12-09 PROCEDURE — 97110 THERAPEUTIC EXERCISES: CPT

## 2019-12-09 PROCEDURE — 97165 OT EVAL LOW COMPLEX 30 MIN: CPT

## 2019-12-09 PROCEDURE — 74011250636 HC RX REV CODE- 250/636: Performed by: NEUROLOGICAL SURGERY

## 2019-12-09 PROCEDURE — 86580 TB INTRADERMAL TEST: CPT | Performed by: NEUROLOGICAL SURGERY

## 2019-12-09 RX ORDER — HYDROCODONE BITARTRATE AND ACETAMINOPHEN 5; 325 MG/1; MG/1
1 TABLET ORAL
Status: DISCONTINUED | OUTPATIENT
Start: 2019-12-09 | End: 2019-12-11 | Stop reason: HOSPADM

## 2019-12-09 RX ADMIN — Medication 10 ML: at 13:38

## 2019-12-09 RX ADMIN — VITAMIN D, TAB 1000IU (100/BT) 2 TABLET: 25 TAB at 17:05

## 2019-12-09 RX ADMIN — SODIUM CHLORIDE, SODIUM LACTATE, POTASSIUM CHLORIDE, AND CALCIUM CHLORIDE 100 ML/HR: 600; 310; 30; 20 INJECTION, SOLUTION INTRAVENOUS at 05:40

## 2019-12-09 RX ADMIN — Medication 5 ML: at 22:30

## 2019-12-09 RX ADMIN — HYDROCODONE BITARTRATE AND ACETAMINOPHEN 1 TABLET: 5; 325 TABLET ORAL at 16:29

## 2019-12-09 RX ADMIN — OXYCODONE HYDROCHLORIDE AND ACETAMINOPHEN 1 TABLET: 10; 325 TABLET ORAL at 01:36

## 2019-12-09 RX ADMIN — OXYCODONE HYDROCHLORIDE AND ACETAMINOPHEN 1 TABLET: 10; 325 TABLET ORAL at 05:33

## 2019-12-09 RX ADMIN — HYDROCODONE BITARTRATE AND ACETAMINOPHEN 1 TABLET: 5; 325 TABLET ORAL at 19:11

## 2019-12-09 RX ADMIN — ATORVASTATIN CALCIUM 10 MG: 10 TABLET, FILM COATED ORAL at 22:29

## 2019-12-09 RX ADMIN — Medication 5 ML: at 22:29

## 2019-12-09 RX ADMIN — HYDROCODONE BITARTRATE AND ACETAMINOPHEN 1 TABLET: 5; 325 TABLET ORAL at 08:55

## 2019-12-09 RX ADMIN — Medication 1 AMPULE: at 08:55

## 2019-12-09 RX ADMIN — FLUTICASONE PROPIONATE 2 SPRAY: 50 SPRAY, METERED NASAL at 08:56

## 2019-12-09 RX ADMIN — HYDROCODONE BITARTRATE AND ACETAMINOPHEN 1 TABLET: 5; 325 TABLET ORAL at 13:38

## 2019-12-09 RX ADMIN — TERAZOSIN HYDROCHLORIDE 5 MG: 5 CAPSULE ORAL at 22:29

## 2019-12-09 RX ADMIN — VITAMIN D, TAB 1000IU (100/BT) 2 TABLET: 25 TAB at 08:55

## 2019-12-09 RX ADMIN — Medication 2 G: at 13:38

## 2019-12-09 RX ADMIN — HYDROCODONE BITARTRATE AND ACETAMINOPHEN 1 TABLET: 5; 325 TABLET ORAL at 22:29

## 2019-12-09 RX ADMIN — Medication 2 G: at 22:29

## 2019-12-09 RX ADMIN — DOCUSATE SODIUM 100 MG: 100 CAPSULE, LIQUID FILLED ORAL at 17:05

## 2019-12-09 RX ADMIN — HYDROCODONE BITARTRATE AND ACETAMINOPHEN 1 TABLET: 5; 325 TABLET ORAL at 10:32

## 2019-12-09 RX ADMIN — LEVOTHYROXINE SODIUM 50 MCG: 50 TABLET ORAL at 05:33

## 2019-12-09 RX ADMIN — DOCUSATE SODIUM 100 MG: 100 CAPSULE, LIQUID FILLED ORAL at 08:55

## 2019-12-09 RX ADMIN — Medication 2 G: at 05:33

## 2019-12-09 RX ADMIN — Medication 1 AMPULE: at 21:00

## 2019-12-09 RX ADMIN — TUBERCULIN PURIFIED PROTEIN DERIVATIVE 5 UNITS: 5 INJECTION, SOLUTION INTRADERMAL at 10:32

## 2019-12-09 NOTE — PROGRESS NOTES
NS  POD#3  AFEBRILE  DRY DRESSING  5/5 POWER  EXCEPT R FOOT DF  WEAK  LBP  A/P MOBILIZE TODAY  NORCO Q 4 H  FOR NOW  5/325  PULL CALUDIO  A/P PT/OT  Aubrie Boyd MD

## 2019-12-09 NOTE — PROGRESS NOTES
Problem: Mobility Impaired (Adult and Pediatric)  Goal: *Acute Goals and Plan of Care (Insert Text)  Description  ST. Patient will perform bed mobility with CONTACT GUARD ASSISTANCE within 3 days. 2. Patient will transfer bed to chair with MINIMAL ASSISTANCE within 3 days. 3. Patient will demonstrate FAIR DYNAMIC STANDING balance within 3 day(s). 4. Patient will ambulate 50+ using least restrictive assistive device and MINIMAL ASSISTANCE within 3 days. 5. Patient will tolerate 15+ minutes of therapeutic activity/exercise and/or neuromuscular re-education while maintaining stable vitals to improve functional strength and activity tolerance within 3 days. LT. Patient will perform bed mobility with STAND BY ASSISTANCE within 7 days. 2. Patient will transfer bed to chair with CONTACT GUARD ASSISTANCE within 7 days. 3. Patient will demonstrate FAIR+ DYNAMIC STANDING balance within 7 day(s). 4. Patient will ambulate 100+ using least restrictive assistive device and CONTACT GUARD ASSISTANCE within 7 days. 5. Patient will tolerate 25+ minutes of therapeutic activity/exercise and/or neuromuscular re-education while maintaining stable vitals to improve functional strength and activity tolerance within 7 days.      Outcome: Progressing Towards Goal       PHYSICAL THERAPY: Daily Note and PM 2019  INPATIENT: PT Visit Days : 1  Payor: Liz Crabtree / Plan: [address] [place] [address] HMO / Product Type: Managed Care Medicare /      SPINAL PRECAUTIONS     NAME/AGE/GENDER: Olive Serna is a 70 y.o. male   PRIMARY DIAGNOSIS: Lumbar stenosis with neurogenic claudication [M48.062]  Lumbar stenosis with neurogenic claudication [M48.062]  Lumbar stenosis with neurogenic claudication [M48.062] Lumbar stenosis with neurogenic claudication Lumbar stenosis with neurogenic claudication  Procedure(s) (LRB):  L2-5 BILATERAL LAMINECTOMY AND FACETECTOMY (N/A)  3 Days Post-Op  ICD-10: Treatment Diagnosis: · Difficulty in walking, Not elsewhere classified (R26.2)  · Low Back Pain (M54.5)   Precaution/Allergies:  Tramadol      ASSESSMENT:     Mr. Daron Drew is a 70year old male 3 days s/p L2-L5 bilateral laminectomy with spinal precautions. Patient seen this AM for initial physical therapy evaluation: presents supine in bed, oriented x4, and endorses 4/10 pain. Patient lives alone with occasional support from grand daughter in a two story residence with 1 steps to enter. At baseline, patient is independent to modified independent with ADLs, ambulates household level distances without an assistive device, community level distances with a SPC PRN, drives and endorses 0 recent falls. Today, B UE strength/ROM WFL, R dorsiflexion weakness 3-/5, L proximal LE weakness 2+/5 to 3-/5, B sensation is intact to light touch L3-S1 B. Reviewed spinal precautions, activity pacing/progression, and safety; patient verbalized and demonstrated understanding. OT addressed bed mobility with minimal to moderate assistance and patient demonstrated fair sitting balance at edge of bed. Pt in chair from AM treatment. Patient performed therapeutic strengthening exercises as listed below to improve endurance, balance and functional strength for transfers, gait and overall mobility. Patient required cues to perform exercises correctly. Sit to stand with CGA to RW. He was able to amb slowly 25' with CGA to min a with VC's to stand erect and safety cues with the RW. He sat to EOB. Reviewed spinal precautions, pt recalled 1/3. Positioned supine by lying to his left side and rolling over. Pt was made comfortable with needs in reach. Mr. Daron Drew presents with decreased functional mobility and balance/gait status from baseline. Recommend continued skilled PT services to address stated deficits and will follow with BID plan of care in house. Patient will benefit from STR at discharge.       This section established at most recent assessment   PROBLEM LIST (Impairments causing functional limitations):  1. Decreased Strength  2. Decreased ADL/Functional Activities  3. Decreased Transfer Abilities  4. Decreased Ambulation Ability/Technique  5. Decreased Balance  6. Decreased Activity Tolerance  7. Decreased Flexibility/Joint Mobility  8. Decreased Knowledge of Precautions   INTERVENTIONS PLANNED: (Benefits and precautions of physical therapy have been discussed with the patient.)  1. Balance Exercise  2. Bed Mobility  3. Family Education  4. Gait Training  5. Group Therapy  6. Home Exercise Program (HEP)  7. Neuromuscular Re-education/Strengthening  8. Range of Motion (ROM)  9. Therapeutic Activites  10. Therapeutic Exercise/Strengthening  11. Transfer Training     TREATMENT PLAN: Frequency/Duration: twice daily for duration of hospital stay  Rehabilitation Potential For Stated Goals: Good     REHAB RECOMMENDATIONS (at time of discharge pending progress):    Placement: It is my opinion, based on this patient's performance to date, that Mr. Keila Hsu may benefit from intensive therapy at [address] after discharge due to the functional deficits listed above that are likely to improve with skilled rehabilitation and concerns that he/she may be unsafe to be unsupervised at home due to decreased functional mobility and balance/gait status. Equipment:    TBD               HISTORY:   History of Present Injury/Illness (Reason for Referral):  S/p L2-5 BILATERAL LAMINECTOMY AND FACETECTOMY (N/A)  Past Medical History/Comorbidities:   Mr. Keila Hsu  has a past medical history of Acne erythematosa, Adjustment disorder, Anxiety, BPH (benign prostatic hyperplasia), Decreased platelet count ([address] Depression, Headache, Hearing loss, Heart murmur, Hypercholesterolemia, Hypertension, Hypothyroidism, Insomnia, and Vitamin D deficiency.  He also has no past medical history of Difficult intubation, Malignant hyperthermia due to anesthesia, Nausea & vomiting, or Pseudocholinesterase deficiency. Mr. Fahad Benavidez  has a past surgical history that includes hx colonoscopy. Social History/Living Environment:   Home Environment: Private residence  # Steps to Enter: 1  One/Two Story Residence: Two story, live on 1st floor  # of Interior Steps: 13  Living Alone: Yes  Support Systems: Family member(s)  Patient Expects to be Discharged to[de-identified] Private residence  Current DME Used/Available at Home: Karen Deacon, straight, Walker, rolling  Tub or Shower Type: Tub/Shower combination  Prior Level of Function/Work/Activity:  At baseline, patient is independent to modified independent with ADLs, ambulates household level distances without an assistive device, community level distances with a SPC PRN, drives and endorses 0 recent falls. Number of Personal Factors/Comorbidities that affect the Plan of Care: 0: LOW COMPLEXITY   EXAMINATION:   Most Recent Physical Functioning:   Gross Assessment:                  Posture:     Balance:  Sitting - Static: Good (unsupported)  Sitting - Dynamic: Good (unsupported)(-)  Standing - Static: Fair  Standing - Dynamic : Fair Bed Mobility:  Sit to Supine: Minimum assistance  Wheelchair Mobility:     Transfers:  Sit to Stand: Contact guard assistance  Stand to Sit: Minimum assistance  Gait:     Base of Support: Center of gravity altered;Narrowed  Speed/Deanna: Shuffled; Slow  Step Length: Left shortened;Right shortened  Gait Abnormalities: Decreased step clearance  Distance (ft): 25 Feet (ft)  Assistive Device: Walker, rolling  Ambulation - Level of Assistance: Minimal assistance;Assist x1  Interventions: Safety awareness training;Verbal cues      Body Structures Involved:  1. Nerves  2. Bones  3. Muscles Body Functions Affected:  1. Neuromusculoskeletal  2. Movement Related Activities and Participation Affected:  1. Mobility  2.  Self Care   Number of elements that affect the Plan of Care: 4+: HIGH COMPLEXITY   CLINICAL PRESENTATION:   Presentation: Stable and uncomplicated: LOW COMPLEXITY   CLINICAL DECISION MAKIN[address] [place] [id number] AM-PAC 6 Clicks   Basic Mobility Inpatient Short Form  How much difficulty does the patient currently have. .. Unable A Lot A Little None   1. Turning over in bed (including adjusting bedclothes, sheets and blankets)? [] 1   [] 2   [x] 3   [] 4   2. Sitting down on and standing up from a chair with arms ( e.g., wheelchair, bedside commode, etc.)   [] 1   [x] 2   [] 3   [] 4   3. Moving from lying on back to sitting on the side of the bed? [] 1   [] 2   [x] 3   [] 4   How much help from another person does the patient currently need. .. Total A Lot A Little None   4. Moving to and from a bed to a chair (including a wheelchair)? [] 1   [] 2   [x] 3   [] 4   5. Need to walk in hospital room? [] 1   [] 2   [x] 3   [] 4   6. Climbing 3-5 steps with a railing? [] 1   [x] 2   [] 3   [] 4   © , Trustees of [address] [place] [address] [address] [address], under license to [employer]. All rights reserved      Score:  Initial: 16 Most Recent: 16 (Date: 19 )    Interpretation of Tool:  Represents activities that are increasingly more difficult (i.e. Bed mobility, Transfers, Gait). Medical Necessity:     · Patient demonstrates   · good  ·  rehab potential due to higher previous functional level. Reason for Services/Other Comments:  · Patient continues to require skilled intervention due to   · Decreased functional mobility and balance/gait status from baseline. · .   Use of outcome tool(s) and clinical judgement create a POC that gives a: Clear prediction of patient's progress: LOW COMPLEXITY            TREATMENT:   (In addition to Assessment/Re-Assessment sessions the following treatments were rendered)   Pre-treatment Symptoms/Complaints:  Requested pain med after treatment, RN notified.    Pain: Initial:   Pain Intensity 1: 5  Post Session:  5/10     Gait Training (  15 min):  Gait training to improve and/or restore physical functioning as related to mobility, strength and balance. Ambulated 25 Feet (ft) with Minimal assistance;Assist x1 using a Walker, rolling and min Safety awareness training;Verbal cues related to their posture and walker safety to promote proper body alignment, promote proper body posture and promote proper body mechanics. Therapeutic Exercise: ( 8 min):  Exercises per grid below to improve mobility, strength and balance. Required min visual and verbal cues to promote proper body alignment, promote proper body posture and promote proper body mechanics. Braces/Orthotics/Lines/Etc:   · IV  Treatment/Session Assessment:    · Response to Treatment:  See above. · Interdisciplinary Collaboration:   o Physical Therapy Assistant  o Registered Nurse  o Rehabilitation Attendant  · After treatment position/precautions:   o Supine in bed  o Bed/Chair-wheels locked  o Bed in low position  o Call light within reach  o RN notified   · Compliance with Program/Exercises: Will assess as treatment progresses  · Recommendations/Intent for next treatment session: \"Next visit will focus on advancements to more challenging activities and reduction in assistance provided\".   Total Treatment Duration:  PT Patient Time In/Time Out  Time In: 1306  Time Out: Alida [address] Charles, PTA

## 2019-12-09 NOTE — PROGRESS NOTES
Problem: Mobility Impaired (Adult and Pediatric)  Goal: *Acute Goals and Plan of Care (Insert Text)  Description  ST. Patient will perform bed mobility with CONTACT GUARD ASSISTANCE within 3 days. 2. Patient will transfer bed to chair with MINIMAL ASSISTANCE within 3 days. 3. Patient will demonstrate FAIR DYNAMIC STANDING balance within 3 day(s). 4. Patient will ambulate 50+ using least restrictive assistive device and MINIMAL ASSISTANCE within 3 days. 5. Patient will tolerate 15+ minutes of therapeutic activity/exercise and/or neuromuscular re-education while maintaining stable vitals to improve functional strength and activity tolerance within 3 days. LT. Patient will perform bed mobility with STAND BY ASSISTANCE within 7 days. 2. Patient will transfer bed to chair with CONTACT GUARD ASSISTANCE within 7 days. 3. Patient will demonstrate FAIR+ DYNAMIC STANDING balance within 7 day(s). 4. Patient will ambulate 100+ using least restrictive assistive device and CONTACT GUARD ASSISTANCE within 7 days. 5. Patient will tolerate 25+ minutes of therapeutic activity/exercise and/or neuromuscular re-education while maintaining stable vitals to improve functional strength and activity tolerance within 7 days.      Outcome: Progressing Towards Goal       PHYSICAL THERAPY: Initial Assessment, Daily Note, and AM 2019  INPATIENT: PT Visit Days : 1  Payor: Kathryn Whitley / Plan: [address] [place] [address] HMO / Product Type: Managed Care Medicare /      SPINAL PRECAUTIONS     NAME/AGE/GENDER: Darling Alas is a 70 y.o. male   PRIMARY DIAGNOSIS: Lumbar stenosis with neurogenic claudication [M48.062]  Lumbar stenosis with neurogenic claudication [M48.062]  Lumbar stenosis with neurogenic claudication [M48.062] Lumbar stenosis with neurogenic claudication Lumbar stenosis with neurogenic claudication  Procedure(s) (LRB):  L2-5 BILATERAL LAMINECTOMY AND FACETECTOMY (N/A)  3 Days Post-Op  ICD-10: Treatment Diagnosis:    · Difficulty in walking, Not elsewhere classified (R26.2)  · Low Back Pain (M54.5)   Precaution/Allergies:  Tramadol      ASSESSMENT:     Mr. Herve Adams is a 70year old male 3 days s/p L2-L5 bilateral laminectomy with spinal precautions. Patient seen this AM for initial physical therapy evaluation: presents supine in bed, oriented x4, and endorses 4/10 pain. Patient lives alone with occasional support from grand daughter in a two story residence with 1 steps to enter. At baseline, patient is independent to modified independent with ADLs, ambulates household level distances without an assistive device, community level distances with a SPC PRN, drives and endorses 0 recent falls. Today, B UE strength/ROM WFL, R dorsiflexion weakness 3-/5, L proximal LE weakness 2+/5 to 3-/5, B sensation is intact to light touch L3-S1 B. Reviewed spinal precautions, activity pacing/progression, and safety; patient verbalized and demonstrated understanding. OT addressed bed mobility with minimal to moderate assistance and patient demonstrated fair sitting balance at edge of bed. Addressed postural retraining, sit to stand and stand to sit transfers with minimal to moderate assistance, and ambulation x5ft from bed to chair with RW and minimal assistance x2. Demonstrated a slow, step-to gait pattern with decreased step clearance, and flexed posture. Cues for gait mechanics, assistive device utilization, and postural re-training throughout. Transferred into bedside chair. No c/o headache throughout mobility; pain 4/10. Mr. Herve Adams presents with decreased functional mobility and balance/gait status from baseline. Recommend continued skilled PT services to address stated deficits and will follow with BID plan of care in house. Patient will benefit from STR at discharge. This section established at most recent assessment   PROBLEM LIST (Impairments causing functional limitations):  1. Decreased Strength  2.  Decreased ADL/Functional Activities  3. Decreased Transfer Abilities  4. Decreased Ambulation Ability/Technique  5. Decreased Balance  6. Decreased Activity Tolerance  7. Decreased Flexibility/Joint Mobility  8. Decreased Knowledge of Precautions   INTERVENTIONS PLANNED: (Benefits and precautions of physical therapy have been discussed with the patient.)  1. Balance Exercise  2. Bed Mobility  3. Family Education  4. Gait Training  5. Group Therapy  6. Home Exercise Program (HEP)  7. Neuromuscular Re-education/Strengthening  8. Range of Motion (ROM)  9. Therapeutic Activites  10. Therapeutic Exercise/Strengthening  11. Transfer Training     TREATMENT PLAN: Frequency/Duration: twice daily for duration of hospital stay  Rehabilitation Potential For Stated Goals: Good     REHAB RECOMMENDATIONS (at time of discharge pending progress):    Placement: It is my opinion, based on this patient's performance to date, that Mr. Oscar Guadarrama may benefit from intensive therapy at a [address] [place] [address] after discharge due to the functional deficits listed above that are likely to improve with skilled rehabilitation and concerns that he/she may be unsafe to be unsupervised at home due to decreased functional mobility and balance/gait status. Equipment:    TBD               HISTORY:   History of Present Injury/Illness (Reason for Referral):  S/p L2-5 BILATERAL LAMINECTOMY AND FACETECTOMY (N/A)  Past Medical History/Comorbidities:   Mr. Oscar Guadarrama  has a past medical history of Acne erythematosa, Adjustment disorder, Anxiety, BPH (benign prostatic hyperplasia), Decreased platelet count ([address] Depression, Headache, Hearing loss, Heart murmur, Hypercholesterolemia, Hypertension, Hypothyroidism, Insomnia, and Vitamin D deficiency. He also has no past medical history of Difficult intubation, Malignant hyperthermia due to anesthesia, Nausea & vomiting, or Pseudocholinesterase deficiency.   Mr. Oscar Guadarrama  has a past surgical history that includes hx colonoscopy. Social History/Living Environment:   Home Environment: Private residence  # Steps to Enter: 1  One/Two Story Residence: Two story, live on 1st floor  # of Interior Steps: 13  Living Alone: Yes  Support Systems: Family member(s)  Patient Expects to be Discharged to[de-identified] Private residence  Current DME Used/Available at Home: Carey Mandril, straight, Walker, rolling  Tub or Shower Type: Shower  Prior Level of Function/Work/Activity:  At baseline, patient is independent to modified independent with ADLs, ambulates household level distances without an assistive device, community level distances with a SPC PRN, drives and endorses 0 recent falls. Number of Personal Factors/Comorbidities that affect the Plan of Care: 0: LOW COMPLEXITY   EXAMINATION:   Most Recent Physical Functioning:   Gross Assessment:  AROM: Generally decreased, functional(proximal L LE; distal R LE)  Strength: Generally decreased, functional(proximal L LE; distal R LE)  Coordination: Generally decreased, functional  Sensation: Intact(Light touch B UE and L3-S1 B)               Posture:     Balance:  Sitting: Impaired  Sitting - Static: Fair (occasional)  Sitting - Dynamic: Fair (occasional); Prop sitting  Standing: Impaired  Standing - Static: Fair  Standing - Dynamic : Fair(-) Bed Mobility:  Rolling: Minimum assistance  Supine to Sit: Moderate assistance  Scooting: Moderate assistance;Minimum assistance  Wheelchair Mobility:     Transfers:  Sit to Stand: Moderate assistance  Stand to Sit: Minimum assistance  Bed to Chair: Minimum assistance;Assist x2  Interventions: Safety awareness training; Tactile cues; Verbal cues  Gait:     Base of Support: Center of gravity altered  Speed/Deanna: Shuffled; Slow  Step Length: Left shortened;Right shortened  Gait Abnormalities: Decreased step clearance; Step to gait  Ambulation - Level of Assistance: Minimal assistance;Assist x2  Interventions: Safety awareness training; Tactile cues; Verbal cues      Body Structures Involved:  1. Nerves  2. Bones  3. Muscles Body Functions Affected:  1. Neuromusculoskeletal  2. Movement Related Activities and Participation Affected:  1. Mobility  2. Self Care   Number of elements that affect the Plan of Care: 4+: HIGH COMPLEXITY   CLINICAL PRESENTATION:   Presentation: Stable and uncomplicated: LOW COMPLEXITY   CLINICAL DECISION MAKIN[address] [place] [address] AM-PAC 6 Clicks   Basic Mobility Inpatient Short Form  How much difficulty does the patient currently have. .. Unable A Lot A Little None   1. Turning over in bed (including adjusting bedclothes, sheets and blankets)? [] 1   [] 2   [x] 3   [] 4   2. Sitting down on and standing up from a chair with arms ( e.g., wheelchair, bedside commode, etc.)   [] 1   [x] 2   [] 3   [] 4   3. Moving from lying on back to sitting on the side of the bed? [] 1   [] 2   [x] 3   [] 4   How much help from another person does the patient currently need. .. Total A Lot A Little None   4. Moving to and from a bed to a chair (including a wheelchair)? [] 1   [] 2   [x] 3   [] 4   5. Need to walk in hospital room? [] 1   [] 2   [x] 3   [] 4   6. Climbing 3-5 steps with a railing? [] 1   [x] 2   [] 3   [] 4   © , Trustees of [address] [place] [address], under license to [employer]. All rights reserved      Score:  Initial: 16 Most Recent: 16 (Date: 19 )    Interpretation of Tool:  Represents activities that are increasingly more difficult (i.e. Bed mobility, Transfers, Gait). Medical Necessity:     · Patient demonstrates   · good  ·  rehab potential due to higher previous functional level. Reason for Services/Other Comments:  · Patient continues to require skilled intervention due to   · Decreased functional mobility and balance/gait status from baseline.    · .   Use of outcome tool(s) and clinical judgement create a POC that gives a: Clear prediction of patient's progress: LOW COMPLEXITY            TREATMENT:   (In addition to Assessment/Re-Assessment sessions the following treatments were rendered)   Pre-treatment Symptoms/Complaints:    Pain: Initial:   Pain Intensity 1: 4  Pain Location 1: Back, Leg  Pain Orientation 1: Left  Pain Intervention(s) 1: Ambulation/Increased Activity, Emotional support, Repositioned, Rest  Post Session:  4/10; unchanged; no c/o dizziness     Initial Evaluation (untimed charge)  Neuromuscular Re-education ( 10 Minutes): Including transfer training with tactile cues, static and dynamic sitting and standing balance training, pre-gait weightshifts, postural re-training with cues, and ambulation on level ground x5ft, and patient education to improve balance, coordination and posture. Required minimal visual, verbal and tactile cues to promote static and dynamic balance in standing. Braces/Orthotics/Lines/Etc:   · IV  · veronica catheter  Treatment/Session Assessment:    · Response to Treatment:  See above. · Interdisciplinary Collaboration:   o Physical Therapist  o Registered Nurse  · After treatment position/precautions:   o Up in chair  o Bed/Chair-wheels locked  o Bed in low position  o Call light within reach  o RN notified  o RN notified of patient status; patient educated on falls precautions and verbalized understanding; no chair alarms on unit; unit supervisor notified; patient in NAD and no HA   · Compliance with Program/Exercises: Will assess as treatment progresses  · Recommendations/Intent for next treatment session: \"Next visit will focus on advancements to more challenging activities and reduction in assistance provided\".   Total Treatment Duration:  PT Patient Time In/Time Out  Time In: 0926  Time Out: CLAYTON Cruz

## 2019-12-09 NOTE — PROGRESS NOTES
Problem: Falls - Risk of  Goal: *Absence of Falls  Description  Document Mustapha Beasley Fall Risk and appropriate interventions in the flowsheet. Outcome: Progressing Towards Goal  Note: Fall Risk Interventions:  Mobility Interventions: Bed/chair exit alarm, Communicate number of staff needed for ambulation/transfer, Patient to call before getting OOB         Medication Interventions: Bed/chair exit alarm, Patient to call before getting OOB    Elimination Interventions: Call light in reach, Patient to call for help with toileting needs, Bed/chair exit alarm              Problem: Patient Education: Go to Patient Education Activity  Goal: Patient/Family Education  Outcome: Progressing Towards Goal     Problem: Simple Spine Procedure:  Day of Surgery  Goal: Activity/Safety  Outcome: Progressing Towards Goal  Goal: Consults, if ordered  Outcome: Progressing Towards Goal  Goal: Discharge Planning  Outcome: Progressing Towards Goal  Goal: Medications  Outcome: Progressing Towards Goal  Goal: Treatments/Interventions/Procedures  Outcome: Progressing Towards Goal  Goal: Psychosocial  Outcome: Progressing Towards Goal  Goal: *Verbalizes understanding of type and use of pain medication  Outcome: Progressing Towards Goal  Goal: *Optimal pain control at patient's stated goal  Outcome: Progressing Towards Goal  Goal: *Verbalizes/demonstrates understanding of proper body mechanics and use of stabilization device if ordered  Outcome: Progressing Towards Goal  Goal: *Activity level attained as ordered  Outcome: Progressing Towards Goal  Goal: *Active bowel sounds  Outcome: Progressing Towards Goal  Goal: *Adequate urinary output  Outcome: Progressing Towards Goal  Goal: *Hemodynamically stable  Outcome: Progressing Towards Goal     Problem: Pressure Injury - Risk of  Goal: *Prevention of pressure injury  Description  Document Ravi Scale and appropriate interventions in the flowsheet.   Outcome: Progressing Towards Goal  Note: Pressure Injury Interventions:  Sensory Interventions: Assess changes in LOC    Moisture Interventions: Absorbent underpads    Activity Interventions: Increase time out of bed, Pressure redistribution bed/mattress(bed type), PT/OT evaluation    Mobility Interventions: HOB 30 degrees or less, Pressure redistribution bed/mattress (bed type), PT/OT evaluation    Nutrition Interventions: Document food/fluid/supplement intake    Friction and Shear Interventions: HOB 30 degrees or less                Problem: Patient Education: Go to Patient Education Activity  Goal: Patient/Family Education  Outcome: Progressing Towards Goal

## 2019-12-09 NOTE — PROGRESS NOTES
Problem: Self Care Deficits Care Plan (Adult)  Goal: *Acute Goals and Plan of Care (Insert Text)  Description  1. Patient will verbalize and demonstrate understanding of spinal precautions with 100% accuracy during ADLs. 2. Patient will complete lower body bathing and dressing with minimal assistance and adaptive equipment as needed. 3. Patient will complete functional transfers with supervision and adaptive equipment as needed. 4. Patient will complete toileting and toilet transfer with SBA. 5. Patient will complete functional mobility of household distances with CGA and adaptive equipment as needed. 6. Patient will demonstrate ability to log roll in bed with supervision and no verbal cues from therapist.     Timeframe: 7 visits    Outcome: Progressing Towards Goal     OCCUPATIONAL THERAPY: Initial Assessment, Daily Note, and AM 12/9/2019  INPATIENT: OT Visit Days: 1  Payor: [employer] / Plan: [address] [place] [address] HMO / Product Type: Managed Care Medicare /      [employer]: Lexis Archer is a 70 y.o. male   PRIMARY DIAGNOSIS:  Lumbar stenosis with neurogenic claudication [M48.062]  Lumbar stenosis with neurogenic claudication [M48.062]  Lumbar stenosis with neurogenic claudication [M48.062] Lumbar stenosis with neurogenic claudication Lumbar stenosis with neurogenic claudication  Procedure(s) (LRB):  L2-5 BILATERAL LAMINECTOMY AND FACETECTOMY (N/A)  3 Days Post-Op  ICD-10: Treatment Diagnosis:    Generalized Muscle Weakness (M62.81)  Other lack of cordination (R27.8)  Low Back Pain (M54.5)   Precautions/Allergies:     Tramadol      ASSESSMENT:     Mr. Calvin Sullivan presents s/p Bilateral laminectomy and facetectomy. Pt has been flat in the bed per orders for several days due to a dural tear. Pt was supine in the bed upon arrival and endorses 4/10 pain in her L LE/lower back.  Pt lives with his granddaughter but states she works part-time and has two small children and doesn't have a lot of time to assist him. Pt used a cane for functional mobility and was completing ADL tasks without assistance prior to surgery. Pt denies any recent falls. Pt was still driving. Pt reports hx of R LE weakness with limited dorsiflexion in the R foot. Pt states he is having new weakness in the L LE. Pt demonstrates functional but decreased strength in the upper body. Pt educated on spinal precautions and how they apply to ADL tasks. Pt needed minimal to moderate assistance with log rolling technique requiring some additional time. Pt reports some minor dizziness in sitting and sat edge of bed several minutes before attempting to standing. Pt required moderate assistance to scoot to the edge of the bed to place feet on the floor. Pt reports only slight headache but states it did not increase with sitting up. Pt stood with minimal to moderate assistance and was able to bear weight through B LEs. Pt was able to take a few steps and turn to be set-up in the chair. Pt reports that after mobilizing that his pain remained at a 4/10. Pt verbalizes understanding to call for staff for assistance when getting up out of the chair. Pt is currently functioning below baseline and will benefit from OT services to address stated goals and plan of care.      This section established at most recent assessment   PROBLEM LIST (Impairments causing functional limitations):  Decreased Strength  Decreased ADL/Functional Activities  Decreased Transfer Abilities  Decreased Ambulation Ability/Technique  Decreased Balance  Increased Pain  Decreased Activity Tolerance  Increased Fatigue  Decreased Flexibility/Joint Mobility  Decreased [place] with Home Exercise Program   INTERVENTIONS PLANNED: (Benefits and precautions of occupational therapy have been discussed with the patient.)  Activities of daily living training  Adaptive equipment training  Balance training  Clothing management  Donning&doffing training  Neuromuscular re-eduation  Therapeutic activity  Therapeutic exercise     TREATMENT PLAN: Frequency/Duration: Follow patient 3 times per week to address above goals. Rehabilitation Potential For Stated Goals: Excellent     REHAB RECOMMENDATIONS (at time of discharge pending progress):    Placement: It is my opinion, based on this patient's performance to date, that Mr. Anya Wright may benefit from intensive therapy at a [address] [place] [address] after discharge due to the functional deficits listed above that are likely to improve with skilled rehabilitation and concerns that he/she may be unsafe to be unsupervised at home due to risk for falls and further functional decline. Equipment:   TBD               OCCUPATIONAL PROFILE AND HISTORY:   History of Present Injury/Illness (Reason for Referral):  See H&P  Past Medical History/Comorbidities:   Mr. Anya Wright  has a past medical history of Acne erythematosa, Adjustment disorder, Anxiety, BPH (benign prostatic hyperplasia), Decreased platelet count ([address] Depression, Headache, Hearing loss, Heart murmur, Hypercholesterolemia, Hypertension, Hypothyroidism, Insomnia, and Vitamin D deficiency. He also has no past medical history of Difficult intubation, Malignant hyperthermia due to anesthesia, Nausea & vomiting, or Pseudocholinesterase deficiency. Mr. Anya Wright  has a past surgical history that includes hx colonoscopy. Social History/Living Environment:   Home Environment: Private residence  # Steps to Enter: 1  One/Two Story Residence: Two story, live on 1st floor  # of Interior Steps: 13  Living Alone: Yes  Support Systems: Family member(s)  Patient Expects to be Discharged to[de-identified] Private residence  Current DME Used/Available at Home: Cane, straight, Walker, rolling  Tub or Shower Type: Tub/Shower combination  Prior Level of Function/Work/Activity:  Pt lives with his granddaughter but states she works part-time and has two small children and doesn't have a lot of time to assist him.  Pt used a cane for functional mobility and was completing ADL tasks without assistance prior to surgery. Pt denies any recent falls. Pt was still driving. Personal Factors:          Social Background:  Limited support at home        Past/Current Experience:  dural tear and was in bed flat for multiple days in a row        Other factors that influence how disability is experienced by the patient:  multiple co-morbidities    Number of Personal Factors/Comorbidities that affect the Plan of Care: Extensive review of physical, cognitive, and psychosocial performance (3+):  HIGH COMPLEXITY   ASSESSMENT OF OCCUPATIONAL PERFORMANCE[de-identified]   Activities of Daily Living:   Basic ADLs (From Assessment) Complex ADLs (From Assessment)   Feeding: Setup  Oral Facial Hygiene/Grooming: Stand-by assistance  Bathing: Moderate assistance  Upper Body Dressing: Minimum assistance  Lower Body Dressing: Maximum assistance  Toileting: Maximum assistance Instrumental ADL  Meal Preparation: Total assistance  Homemaking: Total assistance   Grooming/Bathing/Dressing Activities of Daily Living     Cognitive Retraining  Safety/Judgement: Fall prevention                       Bed/Mat Mobility  Rolling: Minimum assistance  Supine to Sit: Moderate assistance  Sit to Stand: Moderate assistance  Stand to Sit: Minimum assistance  Bed to Chair: Minimum assistance;Assist x2  Scooting: Moderate assistance     Most Recent Physical Functioning:   Gross Assessment:  AROM: Generally decreased, functional(arthritis in R shoulder)  Strength: Generally decreased, functional(B UE)  Coordination: Generally decreased, functional  Sensation: Intact(B UE)               Posture:     Balance:  Sitting: Impaired  Sitting - Static: Fair (occasional)  Sitting - Dynamic: Fair (occasional)  Standing: Impaired; With support  Standing - Static: Fair  Standing - Dynamic : Fair(-) Bed Mobility:  Rolling: Minimum assistance  Supine to Sit: Moderate assistance  Scooting:  Moderate assistance  Wheelchair Mobility: Transfers:  Sit to Stand: Moderate assistance  Stand to Sit: Minimum assistance  Bed to Chair: Minimum assistance;Assist x2  Interventions: Safety awareness training; Tactile cues; Verbal cues            Patient Vitals for the past 6 hrs:   BP BP Patient Position SpO2 Pulse   12/09/19 0724 151/74 Supine 95 % 95       Mental Status  Neurologic State: Alert  Orientation Level: Oriented X4  Cognition: Follows commands  Perception: Appears intact  Perseveration: No perseveration noted  Safety/Judgement: Fall prevention                          Physical Skills Involved:  Range of Motion  Balance  Strength  Activity Tolerance  Pain (acute) Cognitive Skills Affected (resulting in the inability to perform in a timely and safe manner):  None  Psychosocial Skills Affected:  Habits/Routines  Self-Awareness   Number of elements that affect the Plan of Care: 5+:  HIGH COMPLEXITY   CLINICAL DECISION MAKING:   [facility] AM-PAC 6 Clicks   Daily Activity Inpatient Short Form  How much help from another person does the patient currently need. .. Total A Lot A Little None   1. Putting on and taking off regular lower body clothing? [] 1   [x] 2   [] 3   [] 4   2. Bathing (including washing, rinsing, drying)? [] 1   [x] 2   [] 3   [] 4   3. Toileting, which includes using toilet, bedpan or urinal?   [] 1   [x] 2   [] 3   [] 4   4. Putting on and taking off regular upper body clothing? [] 1   [] 2   [x] 3   [] 4   5. Taking care of personal grooming such as brushing teeth? [] 1   [] 2   [x] 3   [] 4   6. Eating meals? [] 1   [] 2   [x] 3   [] 4   © 2007, Trustees of [facility], under license to [employer] All rights reserved      Score:  Initial: 15 Most Recent: X (Date: -- )    Interpretation of Tool:  Represents activities that are increasingly more difficult (i.e. Bed mobility, Transfers, Gait).     Medical Necessity:     Patient demonstrates   excellent   rehab potential due to higher previous functional level. Reason for Services/Other Comments:  Patient continues to require skilled intervention due to   Decreased independence with ADL/functional transfers   . Use of outcome tool(s) and clinical judgement create a POC that gives a: LOW COMPLEXITY         TREATMENT:   (In addition to Assessment/Re-Assessment sessions the following treatments were rendered)     Pre-treatment Symptoms/Complaints:    Pain: Initial:   Pain Intensity 1: 4  Pain Location 1: Leg  Pain Orientation 1: Left  Pain Intervention(s) 1: Repositioned  Post Session: 4/10     Today's treatment session addressed Decreased Strength, Decreased ADL/Functional Activities, Decreased Transfer Abilities, Decreased Ambulation Ability/Technique, Decreased Balance, Increased Pain, Decreased Activity Tolerance, Decreased Flexibility/Joint Mobility, Decreased Knowledge of Precautions, and Decreased [place] with Home Exercise Program to progress towards achieving goal(s) 1, 3, and 6. During this session,  Physical Therapy addressed  Bed Mobility to progress towards their discipline specific goal(s). Co-treatment was necessary to improve patient's ability to increase activity demands, ability to return to normal functional activity, and for safety due to patient flat in bed for several days due to dural tear. Therapeutic Activity: (    8 minutes): Therapeutic activities including Bed transfers and Chair transfers to improve mobility, strength, balance, and coordination. Required minimal to moderate Safety awareness training; Tactile cues; Verbal cues to promote static and dynamic balance in standing. N/a     Braces/Orthotics/Lines/Etc:   IV  veronica catheter  Treatment/Session Assessment:    Response to Treatment:  Pt tolerated it well with no increase in pain levels.    Interdisciplinary Collaboration:   Physical Therapist  Occupational Therapist  Registered Nurse  After treatment position/precautions:   Up in chair  Bed/Chair-wheels locked  Call light within reach  RN notified  Nurse at bedside   Compliance with Program/Exercises: Will assess as treatment progresses. Recommendations/Intent for next treatment session: \"Next visit will focus on advancements to more challenging activities and reduction in assistance provided\".   Total Treatment Duration:  OT Patient Time In/Time Out  Time In: 0926  Time Out: [facility],

## 2019-12-09 NOTE — PROGRESS NOTES
Pt is a 71 yo male admitted for a due to bilateral laminectomy due to lumbar stenosis. Pt has been evaluated by therapy with the recommendation for STR in a SNF. MICKI met with pt and his grandson to discuss choice of rehab facility. List of facilities provided with [address] facilities noted. Pt requested a referral to [facility]. He will review the list to determine other choices in the event [facility] does not have a bed available. Referral sent and PPD ordered today. Pt's insurance requires precert which SW will initiated once bed offer is received. SW following to facilitate pt's transfer to rehab at discharge. The Plan for Transition of Care is related to the following treatment goals: Subacute rehab in an effort to regain strength and return to baseline functioning for a safe transition to home. The Patient and/or patient representative was provided with a choice of provider and agrees with the discharge plan. [x] Yes [] No    Freedom of choice list was provided with basic dialogue that supports the patient's individualized plan of care/goals, treatment preferences and shares the quality data associated with the providers. [x] Yes [] No    Care Management Interventions  PCP Verified by CM:  Yes  Mode of Transport at Discharge: BLS([facility])  Transition of Care Consult (CM Consult): SNF  Partner SNF: No  Reason Why Partner SNF Not Chosen: Location  Discharge Durable Medical Equipment: No  Physical Therapy Consult: Yes  Occupational Therapy Consult: Yes  Speech Therapy Consult: No  Current Support Network: Own Home(grandson lives with him)  Confirm Follow Up Transport: Family  Plan discussed with Pt/Family/Caregiver: Yes  Freedom of Choice Offered: Yes  Discharge Location  Discharge Placement: Rehab Unit Subacute

## 2019-12-10 LAB
MM INDURATION POC: 0 MM (ref 0–5)
PPD POC: NEGATIVE NEGATIVE

## 2019-12-10 PROCEDURE — 74011250637 HC RX REV CODE- 250/637: Performed by: NEUROLOGICAL SURGERY

## 2019-12-10 PROCEDURE — 65270000029 HC RM PRIVATE

## 2019-12-10 PROCEDURE — 97110 THERAPEUTIC EXERCISES: CPT

## 2019-12-10 PROCEDURE — 97530 THERAPEUTIC ACTIVITIES: CPT

## 2019-12-10 PROCEDURE — 74011250636 HC RX REV CODE- 250/636: Performed by: NEUROLOGICAL SURGERY

## 2019-12-10 RX ORDER — ADHESIVE BANDAGE
30 BANDAGE TOPICAL DAILY PRN
Status: DISCONTINUED | OUTPATIENT
Start: 2019-12-10 | End: 2019-12-11 | Stop reason: HOSPADM

## 2019-12-10 RX ADMIN — TERAZOSIN HYDROCHLORIDE 5 MG: 5 CAPSULE ORAL at 22:41

## 2019-12-10 RX ADMIN — OXYCODONE HYDROCHLORIDE AND ACETAMINOPHEN 1 TABLET: 10; 325 TABLET ORAL at 04:12

## 2019-12-10 RX ADMIN — HYDROCODONE BITARTRATE AND ACETAMINOPHEN 1 TABLET: 5; 325 TABLET ORAL at 09:52

## 2019-12-10 RX ADMIN — Medication 1 AMPULE: at 22:41

## 2019-12-10 RX ADMIN — LEVOTHYROXINE SODIUM 50 MCG: 50 TABLET ORAL at 06:31

## 2019-12-10 RX ADMIN — Medication 5 ML: at 13:07

## 2019-12-10 RX ADMIN — LORAZEPAM 1 MG: 0.5 TABLET ORAL at 22:49

## 2019-12-10 RX ADMIN — Medication 5 ML: at 23:13

## 2019-12-10 RX ADMIN — HYDROCODONE BITARTRATE AND ACETAMINOPHEN 1 TABLET: 5; 325 TABLET ORAL at 06:31

## 2019-12-10 RX ADMIN — HYDROCODONE BITARTRATE AND ACETAMINOPHEN 1 TABLET: 5; 325 TABLET ORAL at 22:41

## 2019-12-10 RX ADMIN — Medication 5 ML: at 06:31

## 2019-12-10 RX ADMIN — LATANOPROST 1 DROP: 50 SOLUTION OPHTHALMIC at 23:13

## 2019-12-10 RX ADMIN — VITAMIN D, TAB 1000IU (100/BT) 2 TABLET: 25 TAB at 16:43

## 2019-12-10 RX ADMIN — Medication 2 G: at 06:30

## 2019-12-10 RX ADMIN — FLUTICASONE PROPIONATE 2 SPRAY: 50 SPRAY, METERED NASAL at 08:58

## 2019-12-10 RX ADMIN — Medication 5 ML: at 06:30

## 2019-12-10 RX ADMIN — ATORVASTATIN CALCIUM 10 MG: 10 TABLET, FILM COATED ORAL at 22:41

## 2019-12-10 RX ADMIN — HYDROCODONE BITARTRATE AND ACETAMINOPHEN 1 TABLET: 5; 325 TABLET ORAL at 13:05

## 2019-12-10 RX ADMIN — DOCUSATE SODIUM 100 MG: 100 CAPSULE, LIQUID FILLED ORAL at 16:43

## 2019-12-10 RX ADMIN — HYDROCODONE BITARTRATE AND ACETAMINOPHEN 1 TABLET: 5; 325 TABLET ORAL at 16:43

## 2019-12-10 RX ADMIN — VITAMIN D, TAB 1000IU (100/BT) 2 TABLET: 25 TAB at 08:58

## 2019-12-10 RX ADMIN — DOCUSATE SODIUM 100 MG: 100 CAPSULE, LIQUID FILLED ORAL at 08:58

## 2019-12-10 RX ADMIN — Medication 1 AMPULE: at 08:58

## 2019-12-10 RX ADMIN — HYDROCODONE BITARTRATE AND ACETAMINOPHEN 1 TABLET: 5; 325 TABLET ORAL at 18:58

## 2019-12-10 NOTE — PROGRESS NOTES
Spiritual Care Visit, Follow up visit. Visited with patient at bedside. Prayed for patient's healing and health. Visit by Mulu Pierce, Staff .  Matt, Kamilla.B., B.A.

## 2019-12-10 NOTE — PROGRESS NOTES
Problem: Mobility Impaired (Adult and Pediatric)  Goal: *Acute Goals and Plan of Care (Insert Text)  Description  ST. Patient will perform bed mobility with CONTACT GUARD ASSISTANCE within 3 days. 2. Patient will transfer bed to chair with MINIMAL ASSISTANCE within 3 days. 3. Patient will demonstrate FAIR DYNAMIC STANDING balance within 3 day(s). 4. Patient will ambulate 50+ using least restrictive assistive device and MINIMAL ASSISTANCE within 3 days. 5. Patient will tolerate 15+ minutes of therapeutic activity/exercise and/or neuromuscular re-education while maintaining stable vitals to improve functional strength and activity tolerance within 3 days. LT. Patient will perform bed mobility with STAND BY ASSISTANCE within 7 days. 2. Patient will transfer bed to chair with CONTACT GUARD ASSISTANCE within 7 days. 3. Patient will demonstrate FAIR+ DYNAMIC STANDING balance within 7 day(s). 4. Patient will ambulate 100+ using least restrictive assistive device and CONTACT GUARD ASSISTANCE within 7 days. 5. Patient will tolerate 25+ minutes of therapeutic activity/exercise and/or neuromuscular re-education while maintaining stable vitals to improve functional strength and activity tolerance within 7 days.      Outcome: Progressing Towards Goal       PHYSICAL THERAPY: Daily Note and AM 12/10/2019  INPATIENT: PT Visit Days : 2  Payor: Anastacio Mai / Plan: [address] [place] [address] HMO / Product Type: Managed Care Medicare /      SPINAL PRECAUTIONS     NAME/AGE/GENDER: Tomas Quesada is a 70 y.o. male   PRIMARY DIAGNOSIS: Lumbar stenosis with neurogenic claudication [M48.062]  Lumbar stenosis with neurogenic claudication [M48.062]  Lumbar stenosis with neurogenic claudication [M48.062] Lumbar stenosis with neurogenic claudication Lumbar stenosis with neurogenic claudication  Procedure(s) (LRB):  L2-5 BILATERAL LAMINECTOMY AND FACETECTOMY (N/A)  4 Days Post-Op  ICD-10: Treatment Diagnosis: · Difficulty in walking, Not elsewhere classified (R26.2)  · Low Back Pain (M54.5)   Precaution/Allergies:  Tramadol      ASSESSMENT:     Mr. Daron Drew is a 70year old male 3 days s/p L2-L5 bilateral laminectomy with spinal precautions. Patient seen this AM for initial physical therapy evaluation: presents supine in bed, oriented x4, and endorses 4/10 pain. Patient lives alone with occasional support from grand daughter in a two story residence with 1 steps to enter. At baseline, patient is independent to modified independent with ADLs, ambulates household level distances without an assistive device, community level distances with a SPC PRN, drives and endorses 0 recent falls. Today, B UE strength/ROM WFL, R dorsiflexion weakness 3-/5, L proximal LE weakness 2+/5 to 3-/5, B sensation is intact to light touch L3-S1 B. Reviewed spinal precautions, activity pacing/progression, and safety; patient verbalized and demonstrated understanding. 12/10:  Patient was supine and willing to participate. He performed supine exercises below with fair ability and some assistance with L leg as it is sore to move. He rolled with  Minimal assist and sat up from sidelying with minimal assist.  He stood into the walker with CGA and was initially a bit off balance. He walked about 80 feet with improved balance and CGA. Good progress and working toward all goals. This section established at most recent assessment   PROBLEM LIST (Impairments causing functional limitations):  1. Decreased Strength  2. Decreased ADL/Functional Activities  3. Decreased Transfer Abilities  4. Decreased Ambulation Ability/Technique  5. Decreased Balance  6. Decreased Activity Tolerance  7. Decreased Flexibility/Joint Mobility  8. Decreased Knowledge of Precautions   INTERVENTIONS PLANNED: (Benefits and precautions of physical therapy have been discussed with the patient.)  1. Balance Exercise  2. Bed Mobility  3. Family Education  4.  Gait Training  5. Group Therapy  6. Home Exercise Program (HEP)  7. Neuromuscular Re-education/Strengthening  8. Range of Motion (ROM)  9. Therapeutic Activites  10. Therapeutic Exercise/Strengthening  11. Transfer Training     TREATMENT PLAN: Frequency/Duration: twice daily for duration of hospital stay  Rehabilitation Potential For Stated Goals: Good     REHAB RECOMMENDATIONS (at time of discharge pending progress):    Placement: It is my opinion, based on this patient's performance to date, that Mr. Fahad Benavidez may benefit from intensive therapy at a [address] [place] [address] after discharge due to the functional deficits listed above that are likely to improve with skilled rehabilitation and concerns that he/she may be unsafe to be unsupervised at home due to decreased functional mobility and balance/gait status. Equipment:    TBD               HISTORY:   History of Present Injury/Illness (Reason for Referral):  S/p L2-5 BILATERAL LAMINECTOMY AND FACETECTOMY (N/A)  Past Medical History/Comorbidities:   Mr. Fahad Benavidez  has a past medical history of Acne erythematosa, Adjustment disorder, Anxiety, BPH (benign prostatic hyperplasia), Decreased platelet count ([facility] [address] Depression, Headache, Hearing loss, Heart murmur, Hypercholesterolemia, Hypertension, Hypothyroidism, Insomnia, and Vitamin D deficiency. He also has no past medical history of Difficult intubation, Malignant hyperthermia due to anesthesia, Nausea & vomiting, or Pseudocholinesterase deficiency. Mr. Fahad Benavidez  has a past surgical history that includes hx colonoscopy.   Social History/Living Environment:   Home Environment: Private residence  # Steps to Enter: 1  One/Two Story Residence: Two story, live on 1st floor  # of Interior Steps: 13  Living Alone: Yes  Support Systems: Family member(s)  Patient Expects to be Discharged to[de-identified] Private residence  Current DME Used/Available at Home: Karen Deacon, straight, Walker, rolling  Tub or Shower Type: Tub/Shower combination  Prior Level of Function/Work/Activity:  At baseline, patient is independent to modified independent with ADLs, ambulates household level distances without an assistive device, community level distances with a SPC PRN, drives and endorses 0 recent falls. Number of Personal Factors/Comorbidities that affect the Plan of Care: 0: LOW COMPLEXITY   EXAMINATION:   Most Recent Physical Functioning:   Gross Assessment:                  Posture:     Balance:  Standing - Static: Fair  Standing - Dynamic : Fair Bed Mobility:  Supine to Sit: Minimum assistance  Sit to Supine: Minimum assistance  Wheelchair Mobility:     Transfers:  Sit to Stand: Contact guard assistance  Stand to Sit: Contact guard assistance  Gait:     Speed/Deanna: Shuffled; Slow  Step Length: Left shortened;Right shortened  Gait Abnormalities: Decreased step clearance;Shuffling gait; Steppage gait;Trunk sway increased  Distance (ft): 80 Feet (ft)  Assistive Device: Walker, rolling  Ambulation - Level of Assistance: Contact guard assistance;Minimal assistance      Body Structures Involved:  1. Nerves  2. Bones  3. Muscles Body Functions Affected:  1. Neuromusculoskeletal  2. Movement Related Activities and Participation Affected:  1. Mobility  2. Self Care   Number of elements that affect the Plan of Care: 4+: HIGH COMPLEXITY   CLINICAL PRESENTATION:   Presentation: Stable and uncomplicated: LOW COMPLEXITY   CLINICAL DECISION MAKIN \A Chronology [facility] [place] [facility]\"" [address] [address] AM-PAC 6 Clicks   Basic Mobility Inpatient Short Form  How much difficulty does the patient currently have. .. Unable A Lot A Little None   1. Turning over in bed (including adjusting bedclothes, sheets and blankets)? [] 1   [] 2   [x] 3   [] 4   2. Sitting down on and standing up from a chair with arms ( e.g., wheelchair, bedside commode, etc.)   [] 1   [x] 2   [] 3   [] 4   3. Moving from lying on back to sitting on the side of the bed?    [] 1   [] 2   [x] 3   [] 4   How much help from another person does the patient currently need. .. Total A Lot A Little None   4. Moving to and from a bed to a chair (including a wheelchair)? [] 1   [] 2   [x] 3   [] 4   5. Need to walk in hospital room? [] 1   [] 2   [x] 3   [] 4   6. Climbing 3-5 steps with a railing? [] 1   [x] 2   [] 3   [] 4   © 2007, Trustees of [employer], under license to [employer]. All rights reserved      Score:  Initial: 16 Most Recent: 16 (Date: 12/9/19 )    Interpretation of Tool:  Represents activities that are increasingly more difficult (i.e. Bed mobility, Transfers, Gait). Medical Necessity:     · Patient demonstrates   · good  ·  rehab potential due to higher previous functional level. Reason for Services/Other Comments:  · Patient continues to require skilled intervention due to   · Decreased functional mobility and balance/gait status from baseline. · .   Use of outcome tool(s) and clinical judgement create a POC that gives a: Clear prediction of patient's progress: LOW COMPLEXITY            TREATMENT:   (In addition to Assessment/Re-Assessment sessions the following treatments were rendered)   Pre-treatment Symptoms/Complaints:  Requested pain med after treatment, RN notified. Pain: Initial:   Pain Intensity 1: 3  Post Session:  5/10     Therapeutic Activity: (    15 minutes): Therapeutic activities including Bed transfers and Ambulation on level ground to improve mobility, strength and balance. Required minimal   to promote static and dynamic balance in standing. Therapeutic Exercise: (10 Minutes):  Exercises per grid below to improve mobility, strength and balance. Required min verbal and manual cues to promote proper body mechanics.        Date:  12/10/19 Date:   Date:     Activity/Exercise Parameters Parameters Parameters   Supine AP 20x B     Supine heel slides 10x B AAL     Supine SAQ 10x B     Supine hip abd 10x B AAL                         Braces/Orthotics/Lines/Etc:   · none  Treatment/Session Assessment: · Response to Treatment:  See above. · Interdisciplinary Collaboration:   o Physical Therapy Assistant  o Registered Nurse  · After treatment position/precautions:   o Bed/Chair-wheels locked  o Bed in low position  o Call light within reach  o RN notified  o on commode   · Compliance with Program/Exercises: Compliant all of the time  · Recommendations/Intent for next treatment session: \"Next visit will focus on advancements to more challenging activities and reduction in assistance provided\".   Total Treatment Duration:  PT Patient Time In/Time Out  Time In: 0915  Time Out: 0945    Corinne Roberts PTA

## 2019-12-10 NOTE — PROGRESS NOTES
Pt requested a referral to [facility]. Referral submitted and pt accepted for admission pending insurance approval.  [employer] precert initiated today. Pt notified and is in agreement with this plan. SW following to facilitate pt's transfer to rehab at discharge.

## 2019-12-10 NOTE — PROGRESS NOTES
Initial visit to assess pt's spiritual needs. Feeling today? ok    Receiving good care?  yes    Needs from Spiritual Care:  Prayer  Pt has a moderate degree of concern about his health improving. Ministry of presence & prayer to demonstrate caring & concern, convey emotional & spiritual support.     giuseppe Woods MDiv,[facility],PhD

## 2019-12-10 NOTE — PROGRESS NOTES
Problem: Falls - Risk of  Goal: *Absence of Falls  Description  Document Tyler Ram Fall Risk and appropriate interventions in the flowsheet. Outcome: Progressing Towards Goal  Note: Fall Risk Interventions:  Mobility Interventions: Communicate number of staff needed for ambulation/transfer, Patient to call before getting OOB         Medication Interventions: Bed/chair exit alarm, Patient to call before getting OOB, Teach patient to arise slowly    Elimination Interventions: Call light in reach, Patient to call for help with toileting needs, Toileting schedule/hourly rounds    History of Falls Interventions: Investigate reason for fall         Problem: Patient Education: Go to Patient Education Activity  Goal: Patient/Family Education  Outcome: Progressing Towards Goal     Problem: Pressure Injury - Risk of  Goal: *Prevention of pressure injury  Description  Document Ravi Scale and appropriate interventions in the flowsheet.   Outcome: Progressing Towards Goal  Note: Pressure Injury Interventions:  Sensory Interventions: Assess changes in LOC    Moisture Interventions: Absorbent underpads    Activity Interventions: Pressure redistribution bed/mattress(bed type), Increase time out of bed    Mobility Interventions: HOB 30 degrees or less, Pressure redistribution bed/mattress (bed type)    Nutrition Interventions: Document food/fluid/supplement intake, Offer support with meals,snacks and hydration    Friction and Shear Interventions: HOB 30 degrees or less                Problem: Patient Education: Go to Patient Education Activity  Goal: Patient/Family Education  Outcome: Progressing Towards Goal

## 2019-12-10 NOTE — PROGRESS NOTES
Insurance approval received from [employer]. Preliminary Auth # [id number]  Therapy minutes per week: 550  PT/OT - TG  ST - SD  NTA - NF  RN- PBC1    Continued stay review date: 12/13/19. Updates to be faxed to [facility] at [phone]. MD notified that pt can transfer to rehab on Wednesday if he is medically cleared for discharge. SW following to facilitate pt's transfer to rehab at that time.

## 2019-12-10 NOTE — PROGRESS NOTES
Problem: Falls - Risk of  Goal: *Absence of Falls  Description  Document [facility] Fall Risk and appropriate interventions in the flowsheet. Outcome: Progressing Towards Goal  Note: Fall Risk Interventions:  Mobility Interventions: Bed/chair exit alarm, Communicate number of staff needed for ambulation/transfer, Patient to call before getting OOB         Medication Interventions: Bed/chair exit alarm, Patient to call before getting OOB    Elimination Interventions: Call light in reach, Patient to call for help with toileting needs, Bed/chair exit alarm    History of Falls Interventions: Investigate reason for fall         Problem: Patient Education: Go to Patient Education Activity  Goal: Patient/Family Education  Outcome: Progressing Towards Goal     Problem: Pressure Injury - Risk of  Goal: *Prevention of pressure injury  Description  Document Ravi Scale and appropriate interventions in the flowsheet.   Outcome: Progressing Towards Goal  Note: Pressure Injury Interventions:  Sensory Interventions: Assess changes in LOC    Moisture Interventions: Absorbent underpads    Activity Interventions: Increase time out of bed, Pressure redistribution bed/mattress(bed type), PT/OT evaluation    Mobility Interventions: HOB 30 degrees or less, Pressure redistribution bed/mattress (bed type), PT/OT evaluation    Nutrition Interventions: Document food/fluid/supplement intake    Friction and Shear Interventions: HOB 30 degrees or less                Problem: Patient Education: Go to Patient Education Activity  Goal: Patient/Family Education  Outcome: Progressing Towards Goal

## 2019-12-10 NOTE — PROGRESS NOTES
Problem: Mobility Impaired (Adult and Pediatric)  Goal: *Acute Goals and Plan of Care (Insert Text)  Description  ST. Patient will perform bed mobility with CONTACT GUARD ASSISTANCE within 3 days. 2. Patient will transfer bed to chair with MINIMAL ASSISTANCE within 3 days. 3. Patient will demonstrate FAIR DYNAMIC STANDING balance within 3 day(s). 4. Patient will ambulate 50+ using least restrictive assistive device and MINIMAL ASSISTANCE within 3 days. 5. Patient will tolerate 15+ minutes of therapeutic activity/exercise and/or neuromuscular re-education while maintaining stable vitals to improve functional strength and activity tolerance within 3 days. LT. Patient will perform bed mobility with STAND BY ASSISTANCE within 7 days. 2. Patient will transfer bed to chair with CONTACT GUARD ASSISTANCE within 7 days. 3. Patient will demonstrate FAIR+ DYNAMIC STANDING balance within 7 day(s). 4. Patient will ambulate 100+ using least restrictive assistive device and CONTACT GUARD ASSISTANCE within 7 days. 5. Patient will tolerate 25+ minutes of therapeutic activity/exercise and/or neuromuscular re-education while maintaining stable vitals to improve functional strength and activity tolerance within 7 days.      Outcome: Progressing Towards Goal       PHYSICAL THERAPY: Daily Note and PM 12/10/2019  INPATIENT: PT Visit Days : 2  Payor: Lokesh Paula / Plan: [address] [place] [address] HMO / Product Type: Managed Care Medicare /      SPINAL PRECAUTIONS     NAME/AGE/GENDER: Sandra Perkins is a 70 y.o. male   PRIMARY DIAGNOSIS: Lumbar stenosis with neurogenic claudication [M48.062]  Lumbar stenosis with neurogenic claudication [M48.062]  Lumbar stenosis with neurogenic claudication [M48.062] Lumbar stenosis with neurogenic claudication Lumbar stenosis with neurogenic claudication  Procedure(s) (LRB):  L2-5 BILATERAL LAMINECTOMY AND FACETECTOMY (N/A)  4 Days Post-Op  ICD-10: Treatment Diagnosis: · Difficulty in walking, Not elsewhere classified (R26.2)  · Low Back Pain (M54.5)   Precaution/Allergies:  Tramadol      ASSESSMENT:     Mr. Jose Arredondo is a 70year old male 3 days s/p L2-L5 bilateral laminectomy with spinal precautions. Patient seen this AM for initial physical therapy evaluation: presents supine in bed, oriented x4, and endorses 4/10 pain. Patient lives alone with occasional support from grand daughter in a two story residence with 1 steps to enter. At baseline, patient is independent to modified independent with ADLs, ambulates household level distances without an assistive device, community level distances with a SPC PRN, drives and endorses 0 recent falls. Today, B UE strength/ROM WFL, R dorsiflexion weakness 3-/5, L proximal LE weakness 2+/5 to 3-/5, B sensation is intact to light touch L3-S1 B. Reviewed spinal precautions, activity pacing/progression, and safety; patient verbalized and demonstrated understanding. 12/10:  Patient was supine and willing to participate. He performed supine exercises below with fair ability and some assistance with L leg as it is sore to move. He rolled with  Minimal assist and sat up from sidelying with minimal assist.  He stood into the walker with CGA and was initially a bit off balance. He walked about 80 feet with improved balance and CGA. Good progress and working toward all goals. PM:  Patient is sitting EOB and is worried about the blister on his back and saying he cannot walk due to this. Took a bit to get him to walk with me but he did and did quite well. He needed CGA for safety and minimal assist to lie down. Good progress and awaiting rehab. This section established at most recent assessment   PROBLEM LIST (Impairments causing functional limitations):  1. Decreased Strength  2. Decreased ADL/Functional Activities  3. Decreased Transfer Abilities  4. Decreased Ambulation Ability/Technique  5. Decreased Balance  6.  Decreased Activity Tolerance  7. Decreased Flexibility/Joint Mobility  8. Decreased Knowledge of Precautions   INTERVENTIONS PLANNED: (Benefits and precautions of physical therapy have been discussed with the patient.)  1. Balance Exercise  2. Bed Mobility  3. Family Education  4. Gait Training  5. Group Therapy  6. Home Exercise Program (HEP)  7. Neuromuscular Re-education/Strengthening  8. Range of Motion (ROM)  9. Therapeutic Activites  10. Therapeutic Exercise/Strengthening  11. Transfer Training     TREATMENT PLAN: Frequency/Duration: twice daily for duration of hospital stay  Rehabilitation Potential For Stated Goals: Good     REHAB RECOMMENDATIONS (at time of discharge pending progress):    Placement: It is my opinion, based on this patient's performance to date, that Mr. Kellie Sullivan may benefit from intensive therapy at a [address] [place] [address] after discharge due to the functional deficits listed above that are likely to improve with skilled rehabilitation and concerns that he/she may be unsafe to be unsupervised at home due to decreased functional mobility and balance/gait status. Equipment:    TBD               HISTORY:   History of Present Injury/Illness (Reason for Referral):  S/p L2-5 BILATERAL LAMINECTOMY AND FACETECTOMY (N/A)  Past Medical History/Comorbidities:   Mr. Kellie Sullivan  has a past medical history of Acne erythematosa, Adjustment disorder, Anxiety, BPH (benign prostatic hyperplasia), Decreased platelet count ([address] Depression, Headache, Hearing loss, Heart murmur, Hypercholesterolemia, Hypertension, Hypothyroidism, Insomnia, and Vitamin D deficiency. He also has no past medical history of Difficult intubation, Malignant hyperthermia due to anesthesia, Nausea & vomiting, or Pseudocholinesterase deficiency. Mr. Kellie Sulilvan  has a past surgical history that includes hx colonoscopy.   Social History/Living Environment:   Home Environment: Private residence  # Steps to Enter: 1  One/Two Story Residence: Two story, live on 1st floor  # of Interior Steps: 13  Living Alone: Yes  Support Systems: Family member(s)  Patient Expects to be Discharged to[de-identified] Private residence  Current DME Used/Available at Home: carolann Ordoñez, Walker, rolling  Tub or Shower Type: Tub/Shower combination  Prior Level of Function/Work/Activity:  At baseline, patient is independent to modified independent with ADLs, ambulates household level distances without an assistive device, community level distances with a SPC PRN, drives and endorses 0 recent falls. Number of Personal Factors/Comorbidities that affect the Plan of Care: 0: LOW COMPLEXITY   EXAMINATION:   Most Recent Physical Functioning:   Gross Assessment:                  Posture:     Balance:  Standing - Static: Fair  Standing - Dynamic : Fair Bed Mobility:  Supine to Sit: Minimum assistance  Sit to Supine: Minimum assistance  Wheelchair Mobility:     Transfers:  Sit to Stand: Contact guard assistance  Stand to Sit: Contact guard assistance  Gait:     Speed/Deanna: Shuffled; Slow  Step Length: Left shortened;Right shortened  Gait Abnormalities: Decreased step clearance;Shuffling gait; Steppage gait  Distance (ft): 60 Feet (ft)  Assistive Device: Walker, rolling  Ambulation - Level of Assistance: Contact guard assistance      Body Structures Involved:  1. Nerves  2. Bones  3. Muscles Body Functions Affected:  1. Neuromusculoskeletal  2. Movement Related Activities and Participation Affected:  1. Mobility  2. Self Care   Number of elements that affect the Plan of Care: 4+: HIGH COMPLEXITY   CLINICAL PRESENTATION:   Presentation: Stable and uncomplicated: LOW COMPLEXITY   CLINICAL DECISION MAKIN [facility] [address] [id number] AM-PAC 6 Clicks   Basic Mobility Inpatient Short Form  How much difficulty does the patient currently have. .. Unable A Lot A Little None   1. Turning over in bed (including adjusting bedclothes, sheets and blankets)? [] 1   [] 2   [x] 3   [] 4   2.   Sitting down on and standing up from a chair with arms ( e.g., wheelchair, bedside commode, etc.)   [] 1   [x] 2   [] 3   [] 4   3. Moving from lying on back to sitting on the side of the bed? [] 1   [] 2   [x] 3   [] 4   How much help from another person does the patient currently need. .. Total A Lot A Little None   4. Moving to and from a bed to a chair (including a wheelchair)? [] 1   [] 2   [x] 3   [] 4   5. Need to walk in hospital room? [] 1   [] 2   [x] 3   [] 4   6. Climbing 3-5 steps with a railing? [] 1   [x] 2   [] 3   [] 4   © 2007, Trustees of [address] [place] [address] [address] [address], under license to [employer]. All rights reserved      Score:  Initial: 16 Most Recent: 16 (Date: 12/9/19 )    Interpretation of Tool:  Represents activities that are increasingly more difficult (i.e. Bed mobility, Transfers, Gait). Medical Necessity:     · Patient demonstrates   · good  ·  rehab potential due to higher previous functional level. Reason for Services/Other Comments:  · Patient continues to require skilled intervention due to   · Decreased functional mobility and balance/gait status from baseline. · .   Use of outcome tool(s) and clinical judgement create a POC that gives a: Clear prediction of patient's progress: LOW COMPLEXITY            TREATMENT:   (In addition to Assessment/Re-Assessment sessions the following treatments were rendered)   Pre-treatment Symptoms/Complaints:  I am having pain on my back  Pain: Initial:   Pain Intensity 1: 2  Post Session: comfortable     Therapeutic Activity: (    25 minutes): Therapeutic activities including Bed transfers and Ambulation on level ground to improve mobility, strength and balance. Required minimal   to promote static and dynamic balance in standing.            Date:  12/10/19 Date:   Date:     Activity/Exercise Parameters Parameters Parameters   Supine AP 20x B     Supine heel slides 10x B AAL     Supine SAQ 10x B     Supine hip abd 10x B AAL                         Braces/Orthotics/Lines/Etc: · none  Treatment/Session Assessment:    · Response to Treatment:  See above. · Interdisciplinary Collaboration:   o Physical Therapy Assistant  o Registered Nurse  · After treatment position/precautions:   o Supine in bed  o Bed/Chair-wheels locked  o Bed in low position  o Call light within reach  o RN notified   · Compliance with Program/Exercises: Compliant all of the time  · Recommendations/Intent for next treatment session: \"Next visit will focus on advancements to more challenging activities and reduction in assistance provided\".   Total Treatment Duration:  PT Patient Time In/Time Out  Time In: 1330  Time Out: 1355    Lan Lundborg, PTA

## 2019-12-11 VITALS
TEMPERATURE: 98.2 F | WEIGHT: 122.8 LBS | DIASTOLIC BLOOD PRESSURE: 74 MMHG | HEART RATE: 102 BPM | BODY MASS INDEX: 20.46 KG/M2 | OXYGEN SATURATION: 100 % | HEIGHT: 65 IN | SYSTOLIC BLOOD PRESSURE: 117 MMHG | RESPIRATION RATE: 18 BRPM

## 2019-12-11 LAB
MM INDURATION POC: 0 MM (ref 0–5)
MM INDURATION POC: 0 MM (ref 0–5)
PPD POC: NEGATIVE NEGATIVE
PPD POC: NEGATIVE NEGATIVE

## 2019-12-11 PROCEDURE — 74011250637 HC RX REV CODE- 250/637: Performed by: NEUROLOGICAL SURGERY

## 2019-12-11 PROCEDURE — 97110 THERAPEUTIC EXERCISES: CPT

## 2019-12-11 PROCEDURE — 97530 THERAPEUTIC ACTIVITIES: CPT

## 2019-12-11 RX ORDER — HYDROCODONE BITARTRATE AND ACETAMINOPHEN 7.5; 325 MG/1; MG/1
1 TABLET ORAL
Qty: 40 TAB | Refills: 0 | Status: SHIPPED | OUTPATIENT
Start: 2019-12-11 | End: 2019-12-21

## 2019-12-11 RX ADMIN — HYDROCODONE BITARTRATE AND ACETAMINOPHEN 1 TABLET: 5; 325 TABLET ORAL at 06:17

## 2019-12-11 RX ADMIN — DOCUSATE SODIUM 100 MG: 100 CAPSULE, LIQUID FILLED ORAL at 08:13

## 2019-12-11 RX ADMIN — Medication 1 AMPULE: at 08:13

## 2019-12-11 RX ADMIN — LEVOTHYROXINE SODIUM 50 MCG: 50 TABLET ORAL at 06:17

## 2019-12-11 RX ADMIN — VITAMIN D, TAB 1000IU (100/BT) 2 TABLET: 25 TAB at 08:13

## 2019-12-11 RX ADMIN — HYDROCODONE BITARTRATE AND ACETAMINOPHEN 1 TABLET: 5; 325 TABLET ORAL at 10:37

## 2019-12-11 RX ADMIN — Medication 5 ML: at 06:25

## 2019-12-11 RX ADMIN — FLUTICASONE PROPIONATE 2 SPRAY: 50 SPRAY, METERED NASAL at 08:14

## 2019-12-11 NOTE — PROGRESS NOTES
TRANSFER - OUT REPORT:    Verbal report given to Abhay on [facility]  being transferred to [facility] for routine progression of care       Report consisted of patients Situation, Background, Assessment and   Recommendations(SBAR). Information from the following report(s) SBAR, Kardex, OR Summary, Intake/Output, MAR and Recent Results was reviewed with the receiving nurse. Lines:   Peripheral IV 12/06/19 Left Hand (Active)   Site Assessment Clean, dry, & intact 12/11/2019  8:13 AM   Phlebitis Assessment 0 12/11/2019  8:13 AM   Infiltration Assessment 0 12/11/2019  8:13 AM   Dressing Status Clean, dry, & intact 12/11/2019  8:13 AM   Dressing Type Tape;Transparent 12/11/2019  8:13 AM   Hub Color/Line Status Green 12/11/2019  8:13 AM   Alcohol Cap Used No 12/6/2019  2:09 PM        Opportunity for questions and clarification was provided.       Patient transported with:   [employer]

## 2019-12-11 NOTE — DISCHARGE SUMMARY
[facility]  DISCHARGE SUMMARY    Name:  Domonique López  MR#:  [id number]  :  1940  ACCOUNT #:  [de-identified]  ADMIT DATE:  2019  DISCHARGE DATE:  2019    DISCHARGE DIAGNOSES:  1. Lumbar spinal stenosis with neurogenic claudication. 2.  Right foot drop secondary to above. 3.  Degenerative disk disease. 4.  Anxiety. 5.  Benign prostatic hypertrophy. 6.  Depression. 7.  Headaches. 8.  Hearing loss. 9.  Hypercholesterolemia. 10.  Hypertension. 11.  Hypothyroidism. 12.  Vitamin D deficiency. OPERATIONS AND PROCEDURES:  Bilateral laminectomy and medial facetectomy, L2 through S1 on . COMPLICATIONS:  Small dural tear repaired primarily intraoperatively. HISTORY OF PRESENT ILLNESS:  A 72-year-old gentleman with severe neurogenic claudication refractory to conservative measures. MRI was positive for severe spinal stenosis from L2-S1. The patient was admitted for surgery as conservative measures have failed. ALLERGIES:  TRAMADOL. PAST MEDICAL HISTORY:  As listed above. MEDICATIONS:  Listed on the universal medication form attached to the chart. Please refer to that form. General examination was normal.  Neurologically, he had 3/5 weakness in right foot dorsiflexion and an antalgic flexed forward gait which was slow. HOSPITAL COURSE:  He was taken to the operating room on  and underwent L2-S1 bilateral laminectomy and medial facetectomy. Severe spinal stenosis and compression was present everywhere. A small dural tear was created due to the thin dura from bone erosion into the dura. This was repaired and patched primarily. The patient was kept in the hospital flat for 72 hours and was eventually allowed to ambulate. He was seen by physical therapy and occupational therapy and it was determined that he was a good candidate for short-term inpatient rehab.   Arrangements were made to that end and he was accepted for transfer on 12/11.    CONDITION ON DISCHARGE:  Satisfactory. DISCHARGE MEDICATIONS:  Include admission medicines plus Norco 7.5/325 q.6 hours p.r.n. #40. DISCHARGE INSTRUCTIONS:  Diet regular. Activity up with assistance. Return visit to the office in 2 weeks for staple removal.  The facility will contact our office if he develops any fever, drainage, swelling or neurological issues.       MD YARA [id number]  D:  12/11/2019 7:36  T:  12/11/2019 9:02  JOB #:  [id number]

## 2019-12-11 NOTE — PROGRESS NOTES
Problem: Falls - Risk of  Goal: *Absence of Falls  Description  Document Temi Mina Fall Risk and appropriate interventions in the flowsheet. Outcome: Progressing Towards Goal  Note: Fall Risk Interventions:  Mobility Interventions: Patient to call before getting OOB         Medication Interventions: Patient to call before getting OOB    Elimination Interventions: Call light in reach    History of Falls Interventions: Investigate reason for fall         Problem: Patient Education: Go to Patient Education Activity  Goal: Patient/Family Education  Outcome: Progressing Towards Goal     Problem: Simple Spine Procedure:  Day of Surgery  Goal: Activity/Safety  Outcome: Progressing Towards Goal  Goal: Consults, if ordered  Outcome: Progressing Towards Goal  Goal: Discharge Planning  Outcome: Progressing Towards Goal  Goal: Medications  Outcome: Progressing Towards Goal  Goal: Treatments/Interventions/Procedures  Outcome: Progressing Towards Goal  Goal: Psychosocial  Outcome: Progressing Towards Goal  Goal: *Verbalizes understanding of type and use of pain medication  Outcome: Progressing Towards Goal  Goal: *Optimal pain control at patient's stated goal  Outcome: Progressing Towards Goal  Goal: *Verbalizes/demonstrates understanding of proper body mechanics and use of stabilization device if ordered  Outcome: Progressing Towards Goal  Goal: *Activity level attained as ordered  Outcome: Progressing Towards Goal  Goal: *Active bowel sounds  Outcome: Progressing Towards Goal  Goal: *Adequate urinary output  Outcome: Progressing Towards Goal  Goal: *Hemodynamically stable  Outcome: Progressing Towards Goal     Problem: Pressure Injury - Risk of  Goal: *Prevention of pressure injury  Description  Document Ravi Scale and appropriate interventions in the flowsheet.   Outcome: Progressing Towards Goal  Note: Pressure Injury Interventions:  Sensory Interventions: Assess changes in LOC    Moisture Interventions: Absorbent underpads    Activity Interventions: Increase time out of bed    Mobility Interventions: Pressure redistribution bed/mattress (bed type)    Nutrition Interventions: Document food/fluid/supplement intake, Offer support with meals,snacks and hydration    Friction and Shear Interventions: HOB 30 degrees or less                Problem: Patient Education: Go to Patient Education Activity  Goal: Patient/Family Education  Outcome: Progressing Towards Goal     Problem: Patient Education: Go to Patient Education Activity  Goal: Patient/Family Education  Outcome: Progressing Towards Goal     Problem: Patient Education: Go to Patient Education Activity  Goal: Patient/Family Education  Outcome: Progressing Towards Goal

## 2019-12-11 NOTE — PROGRESS NOTES
Problem: Mobility Impaired (Adult and Pediatric)  Goal: *Acute Goals and Plan of Care (Insert Text)  Description  ST. Patient will perform bed mobility with CONTACT GUARD ASSISTANCE within 3 days. 2. Patient will transfer bed to chair with MINIMAL ASSISTANCE within 3 days. 3. Patient will demonstrate FAIR DYNAMIC STANDING balance within 3 day(s). 4. Patient will ambulate 50+ using least restrictive assistive device and MINIMAL ASSISTANCE within 3 days. 5. Patient will tolerate 15+ minutes of therapeutic activity/exercise and/or neuromuscular re-education while maintaining stable vitals to improve functional strength and activity tolerance within 3 days. LT. Patient will perform bed mobility with STAND BY ASSISTANCE within 7 days. 2. Patient will transfer bed to chair with CONTACT GUARD ASSISTANCE within 7 days. 3. Patient will demonstrate FAIR+ DYNAMIC STANDING balance within 7 day(s). 4. Patient will ambulate 100+ using least restrictive assistive device and CONTACT GUARD ASSISTANCE within 7 days. 5. Patient will tolerate 25+ minutes of therapeutic activity/exercise and/or neuromuscular re-education while maintaining stable vitals to improve functional strength and activity tolerance within 7 days.      Outcome: Progressing Towards Goal       PHYSICAL THERAPY: Daily Note and AM 2019  INPATIENT: PT Visit Days : 2  Payor: [employer] / Plan: [address] [place] [address] HMO / Product Type: Managed Care Medicare /      SPINAL PRECAUTIONS     NAME/AGE/GENDER: Lexis Archer is a 70 y.o. male   PRIMARY DIAGNOSIS: Lumbar stenosis with neurogenic claudication [M48.062]  Lumbar stenosis with neurogenic claudication [M48.062]  Lumbar stenosis with neurogenic claudication [M48.062] Lumbar stenosis with neurogenic claudication Lumbar stenosis with neurogenic claudication  Procedure(s) (LRB):  L2-5 BILATERAL LAMINECTOMY AND FACETECTOMY (N/A)  5 Days Post-Op  ICD-10: Treatment Diagnosis: Quality 131: Pain Assessment And Follow-Up: Pain assessment using a standardized tool is documented as negative, no follow-up plan required · Difficulty in walking, Not elsewhere classified (R26.2)  · Low Back Pain (M54.5)   Precaution/Allergies:  Tramadol      ASSESSMENT:     Mr. Compa Rivera is a 70year old male 3 days s/p L2-L5 bilateral laminectomy with spinal precautions. Patient seen this AM for initial physical therapy evaluation: presents supine in bed, oriented x4, and endorses 4/10 pain. Patient lives alone with occasional support from grand daughter in a two story residence with 1 steps to enter. At baseline, patient is independent to modified independent with ADLs, ambulates household level distances without an assistive device, community level distances with a SPC PRN, drives and endorses 0 recent falls. 12/11:  Patient was sitting up in chair and willing to participate. He performed B LE exercises below with fair ability. Pt stood with CGA and was able to ambulate about 80' using rolling walker and CGA. Pt with 23 yo injury to R foot causing some drop foot, however he manages well with it. Pt does have some decreased balance with ambulation. Pt returned to chair in room and was left with needs in reach. Will continue with POC. This section established at most recent assessment   PROBLEM LIST (Impairments causing functional limitations):  1. Decreased Strength  2. Decreased ADL/Functional Activities  3. Decreased Transfer Abilities  4. Decreased Ambulation Ability/Technique  5. Decreased Balance  6. Decreased Activity Tolerance  7. Decreased Flexibility/Joint Mobility  8. Decreased Knowledge of Precautions   INTERVENTIONS PLANNED: (Benefits and precautions of physical therapy have been discussed with the patient.)  1. Balance Exercise  2. Bed Mobility  3. Family Education  4. Gait Training  5. Group Therapy  6. Home Exercise Program (HEP)  7. Neuromuscular Re-education/Strengthening  8. Range of Motion (ROM)  9. Therapeutic Activites  10. Therapeutic Exercise/Strengthening  11.  Transfer Training     TREATMENT PLAN: Frequency/Duration: twice daily for duration of hospital stay  Rehabilitation Potential For Stated Goals: Good     REHAB RECOMMENDATIONS (at time of discharge pending progress):    Placement: It is my opinion, based on this patient's performance to date, that Mr. Keila Hsu may benefit from intensive therapy at a [address] [place] [address] after discharge due to the functional deficits listed above that are likely to improve with skilled rehabilitation and concerns that he/she may be unsafe to be unsupervised at home due to decreased functional mobility and balance/gait status. Equipment:    TBD               HISTORY:   History of Present Injury/Illness (Reason for Referral):  S/p L2-5 BILATERAL LAMINECTOMY AND FACETECTOMY (N/A)  Past Medical History/Comorbidities:   Mr. Keila Hsu  has a past medical history of Acne erythematosa, Adjustment disorder, Anxiety, BPH (benign prostatic hyperplasia), Decreased platelet count ([facility] [address] Depression, Headache, Hearing loss, Heart murmur, Hypercholesterolemia, Hypertension, Hypothyroidism, Insomnia, and Vitamin D deficiency. He also has no past medical history of Difficult intubation, Malignant hyperthermia due to anesthesia, Nausea & vomiting, or Pseudocholinesterase deficiency. Mr. Keila Hsu  has a past surgical history that includes hx colonoscopy.   Social History/Living Environment:   Home Environment: Private residence  # Steps to Enter: 1  One/Two Story Residence: Two story, live on 1st floor  # of Interior Steps: 13  Living Alone: Yes  Support Systems: Family member(s)  Patient Expects to be Discharged to[de-identified] Private residence  Current DME Used/Available at Home: Samuel Merary, straight, Walker, rolling  Tub or Shower Type: Tub/Shower combination  Prior Level of Function/Work/Activity:  At baseline, patient is independent to modified independent with ADLs, ambulates household level distances without an assistive device, community level distances with a SPC PRN, drives and endorses 0 recent falls.   Number of Personal Factors/Comorbidities that affect the Plan of Care: 0: LOW COMPLEXITY   EXAMINATION:   Most Recent Physical Functioning:   Gross Assessment:                  Posture:     Balance:  Sitting: Intact  Standing - Static: Fair  Standing - Dynamic : Fair Bed Mobility:     Wheelchair Mobility:     Transfers:  Sit to Stand: Contact guard assistance  Stand to Sit: Contact guard assistance  Gait:     Speed/Deanna: Shuffled; Slow  Gait Abnormalities: Decreased step clearance;Shuffling gait; Steppage gait  Distance (ft): 80 Feet (ft)  Assistive Device: Walker, rolling  Ambulation - Level of Assistance: Contact guard assistance      Body Structures Involved:  1. Nerves  2. Bones  3. Muscles Body Functions Affected:  1. Neuromusculoskeletal  2. Movement Related Activities and Participation Affected:  1. Mobility  2. Self Care   Number of elements that affect the Plan of Care: 4+: HIGH COMPLEXITY   CLINICAL PRESENTATION:   Presentation: Stable and uncomplicated: LOW COMPLEXITY   CLINICAL DECISION MAKIN[address] [place] [address] AM-PAC 6 Clicks   Basic Mobility Inpatient Short Form  How much difficulty does the patient currently have. .. Unable A Lot A Little None   1. Turning over in bed (including adjusting bedclothes, sheets and blankets)? [] 1   [] 2   [x] 3   [] 4   2. Sitting down on and standing up from a chair with arms ( e.g., wheelchair, bedside commode, etc.)   [] 1   [x] 2   [] 3   [] 4   3. Moving from lying on back to sitting on the side of the bed? [] 1   [] 2   [x] 3   [] 4   How much help from another person does the patient currently need. .. Total A Lot A Little None   4. Moving to and from a bed to a chair (including a wheelchair)? [] 1   [] 2   [x] 3   [] 4   5. Need to walk in hospital room? [] 1   [] 2   [x] 3   [] 4   6. Climbing 3-5 steps with a railing? [] 1   [x] 2   [] 3   [] 4   © , Trustees of [address] [place] [address], under license to [employer].  All rights Quality 111:Pneumonia Vaccination Status For Older Adults: Pneumococcal Vaccination Previously Received reserved      Score:  Initial: 16 Most Recent: 16 (Date: 12/9/19 )    Interpretation of Tool:  Represents activities that are increasingly more difficult (i.e. Bed mobility, Transfers, Gait). Medical Necessity:     · Patient demonstrates   · good  ·  rehab potential due to higher previous functional level. Reason for Services/Other Comments:  · Patient continues to require skilled intervention due to   · Decreased functional mobility and balance/gait status from baseline. · .   Use of outcome tool(s) and clinical judgement create a POC that gives a: Clear prediction of patient's progress: LOW COMPLEXITY            TREATMENT:      Pre-treatment Symptoms/Complaints:  No complaints. Pain: Initial:      Post Session: comfortable     Therapeutic Activity: (    15 minutes): Therapeutic activities including chair transfers and Ambulation on level ground to improve mobility, strength and balance. Required minimal   to promote static and dynamic balance in standing. Therapeutic Exercise: (  8 minutes):  Exercises per grid below to improve mobility and strength. Required minimal visual and verbal cues to promote proper body posture and promote proper body mechanics. Progressed range and repetitions as indicated. Date:  12/10/19 Date:  1211/19 Date:     Activity/Exercise Parameters Am Parameters   Supine AP 20x B X 20 B seated    Supine heel slides 10x B AAL     Supine SAQ 10x B     Supine hip abd 10x B AAL X 10 B seated    Seated LAQ  X 10 B    Seated hip flexion  X 10 B            Braces/Orthotics/Lines/Etc:   · none  Treatment/Session Assessment:    · Response to Treatment:  See above.   · Interdisciplinary Collaboration:   o Physical Therapy Assistant  o Registered Nurse  · After treatment position/precautions:   o Up in chair  o Bed/Chair-wheels locked  o Bed in low position  o Call light within reach  o RN notified   · Compliance with Program/Exercises: Compliant all of the Detail Level: Detailed time  · Recommendations/Intent for next treatment session: \"Next visit will focus on advancements to more challenging activities and reduction in assistance provided\".   Total Treatment Duration:  PT Patient Time In/Time Out  Time In: 1087  Time Out: [address], PTA Quality 47: Advance Care Plan: Advance Care Planning discussed and documented in the medical record; patient did not wish or was not able to name a surrogate decision maker or provide an advance care plan. Quality 154 Part B: Falls: Risk Screening (Should Be Reported With Measure 155.): Patient screened for future fall risk; documentation of no falls in the past year or only one fall without injury in the past year Quality 402: Tobacco Use And Help With Quitting Among Adolescents: Patient screened for tobacco and never smoked Quality 431: Preventive Care And Screening: Unhealthy Alcohol Use - Screening: Patient screened for unhealthy alcohol use using a single question and scores less than 2 times per year Quality 110: Preventive Care And Screening: Influenza Immunization: Influenza Immunization Administered during Influenza season Quality 155 (Denominator): Falls Plan Of Care: Plan of Care not Documented, Reason not Otherwise Specified Quality 154 Part A: Falls: Risk Assessment (Should Be Reported With Measure 155.): Falls risk assessment completed and documented in the past 12 months.

## 2019-12-11 NOTE — PROGRESS NOTES
Patient is medically ready for discharge per Dr Idalmis Toledo. CM met with patient who prefers to have his friend, Michael Ramos, transport him to [facility]. Nurse, Becky Price, notified to call report to [facility] at [phone]. The Plan for Transition of Care is related to the following treatment goals: [email or web address] in an effort to regain strength and return to baseline functioning for a safe transition to home. The Patient and/or patient representative was provided with a choice of provider and agrees   with the discharge plan. [x] Yes [] No    Freedom of choice list was provided with basic dialogue that supports the patient's individualized plan of care/goals, treatment preferences and shares the quality data associated with the providers. [x] Yes [] No    Care Management Interventions  PCP Verified by CM: Yes  Mode of Transport at Discharge:  Other (see comment)(friend, Michael Ramos, will be transporting)  Transition of Care Consult (CM Consult): SNF([facility])  Partner SNF: No  Reason Why Partner SNF Not Chosen: Location  Discharge Durable Medical Equipment: No  Physical Therapy Consult: Yes  Occupational Therapy Consult: Yes  Speech Therapy Consult: No  Current Support Network: Own Home(grandson lives with him)  Confirm Follow Up Transport: Family  Plan discussed with Pt/Family/Caregiver: Yes  Freedom of Choice Offered: Yes  Discharge Location  Discharge Placement: Skilled nursing facility([facility])

## (undated) DEVICE — SUTURE VCRL + SZ 3-0 L18IN ABSRB UD SH 1/2 CIR TAPERCUT NDL VCP864D

## (undated) DEVICE — SOLUTION IV 1000ML 0.9% SOD CHL

## (undated) DEVICE — SYR LR LCK 1ML GRAD NSAF 30ML --

## (undated) DEVICE — SUTURE VCRL VIO BR 0 18IN C/R M04 J701D

## (undated) DEVICE — GOWN,BREATHABLE SLV,AURORA,LG,STRL: Brand: MEDLINE

## (undated) DEVICE — PACKING 8004002 NEURAY 200PK 13X25MM: Brand: NEURAY ®

## (undated) DEVICE — INTENDED FOR TISSUE SEPARATION, AND OTHER PROCEDURES THAT REQUIRE A SHARP SURGICAL BLADE TO PUNCTURE OR CUT.: Brand: BARD-PARKER SAFETY BLADES SIZE 15, STERILE

## (undated) DEVICE — STAPLER EXT SKIN 35 WIDE S STL STPL SQUEEZE HNDL VISISTAT

## (undated) DEVICE — 1010 S-DRAPE TOWEL DRAPE 10/BX: Brand: STERI-DRAPE™

## (undated) DEVICE — DRAPE TWL SURG 16X26IN BLU ORB04] ALLCARE INC]

## (undated) DEVICE — PIN DISPNS IV ADD FOR RUB STOPPERED MD VI REUSE MINI-SPIKE

## (undated) DEVICE — REM POLYHESIVE ADULT PATIENT RETURN ELECTRODE: Brand: VALLEYLAB

## (undated) DEVICE — 3M™ TEGADERM™ TRANSPARENT FILM DRESSING FRAME STYLE, 1628, 6 IN X 8 IN (15 CM X 20 CM), 10/CT 8CT/CASE: Brand: 3M™ TEGADERM™

## (undated) DEVICE — DRSG AQUACEL SURG 3.5X6IN -- CONVERT TO ITEM 369227

## (undated) DEVICE — SYR 10ML LUER LOK 1/5ML GRAD --

## (undated) DEVICE — ADHESIVE SKIN CLOSURE 4X22 CM PREMIERPRO EXOFINFUSION DISP

## (undated) DEVICE — AGENT HEMSTAT 8ML FLX TIP MTRX + DISP SURGIFLO

## (undated) DEVICE — SURGIFOAM SPNG SZ 100

## (undated) DEVICE — GARMENT,MEDLINE,DVT,INT,CALF,MED, GEN2: Brand: MEDLINE

## (undated) DEVICE — KENDALL SCD EXPRESS SLEEVES, KNEE LENGTH, LARGE: Brand: KENDALL SCD

## (undated) DEVICE — NEURO SPONGES

## (undated) DEVICE — DURASEAL® DURAL SEALANT SYSTEM 5ML 5 PACK: Brand: DURASEAL®

## (undated) DEVICE — (D)PREP SKN CHLRAPRP APPL 26ML -- CONVERT TO ITEM 371833

## (undated) DEVICE — Device: Brand: JELCO

## (undated) DEVICE — STANDARD HYPODERMIC NEEDLE,POLYPROPYLENE HUB: Brand: MONOJECT

## (undated) DEVICE — AGENT HEMSTAT W3XL4IN OXIDIZED REGENERATED CELOS ABSRB FOR

## (undated) DEVICE — POWDER HEMOSTAT GEL 1GR --

## (undated) DEVICE — SPINE NEURO: Brand: MEDLINE INDUSTRIES, INC.

## (undated) DEVICE — WAX SURG 2.5GM HEMSTAT BNE BEESWAX PARAFFIN ISO PALMITATE

## (undated) DEVICE — 2000CC GUARDIAN II: Brand: GUARDIAN

## (undated) DEVICE — GOWN,PREVENTION PLUS,2XL,ST,22/CS: Brand: MEDLINE